# Patient Record
Sex: MALE | Race: WHITE | Employment: OTHER | URBAN - METROPOLITAN AREA
[De-identification: names, ages, dates, MRNs, and addresses within clinical notes are randomized per-mention and may not be internally consistent; named-entity substitution may affect disease eponyms.]

---

## 2017-10-17 ENCOUNTER — OFFICE VISIT (OUTPATIENT)
Dept: FAMILY MEDICINE CLINIC | Age: 82
End: 2017-10-17

## 2017-10-17 VITALS
HEIGHT: 68 IN | OXYGEN SATURATION: 97 % | DIASTOLIC BLOOD PRESSURE: 80 MMHG | SYSTOLIC BLOOD PRESSURE: 124 MMHG | BODY MASS INDEX: 30.95 KG/M2 | TEMPERATURE: 97.2 F | WEIGHT: 204.2 LBS | HEART RATE: 64 BPM

## 2017-10-17 DIAGNOSIS — M79.89 BILATERAL SWELLING OF FEET: ICD-10-CM

## 2017-10-17 DIAGNOSIS — I45.10 RBBB: ICD-10-CM

## 2017-10-17 DIAGNOSIS — Z76.89 ESTABLISHING CARE WITH NEW DOCTOR, ENCOUNTER FOR: ICD-10-CM

## 2017-10-17 DIAGNOSIS — Z71.85 VACCINE COUNSELING: ICD-10-CM

## 2017-10-17 DIAGNOSIS — H91.93 BILATERAL HEARING LOSS, UNSPECIFIED HEARING LOSS TYPE: ICD-10-CM

## 2017-10-17 DIAGNOSIS — Z91.81 AT HIGH RISK FOR FALLS: ICD-10-CM

## 2017-10-17 DIAGNOSIS — I48.91 ATRIAL FIBRILLATION, UNSPECIFIED TYPE (HCC): ICD-10-CM

## 2017-10-17 DIAGNOSIS — R26.81 UNSTEADY GAIT: ICD-10-CM

## 2017-10-17 DIAGNOSIS — R44.9 ABNORMAL FEELING: Primary | ICD-10-CM

## 2017-10-17 DIAGNOSIS — R94.31 ABNORMAL EKG: ICD-10-CM

## 2017-10-17 PROCEDURE — 99203 OFFICE O/P NEW LOW 30 MIN: CPT | Performed by: FAMILY MEDICINE

## 2017-10-17 PROCEDURE — 93000 ELECTROCARDIOGRAM COMPLETE: CPT | Performed by: FAMILY MEDICINE

## 2017-10-17 ASSESSMENT — ENCOUNTER SYMPTOMS
NAUSEA: 0
ABDOMINAL PAIN: 0
VOMITING: 0
DIARRHEA: 0
COUGH: 0
SHORTNESS OF BREATH: 0

## 2017-10-17 ASSESSMENT — PATIENT HEALTH QUESTIONNAIRE - PHQ9
1. LITTLE INTEREST OR PLEASURE IN DOING THINGS: 0
SUM OF ALL RESPONSES TO PHQ9 QUESTIONS 1 & 2: 0
SUM OF ALL RESPONSES TO PHQ QUESTIONS 1-9: 0
2. FEELING DOWN, DEPRESSED OR HOPELESS: 0

## 2017-10-17 NOTE — PROGRESS NOTES
Subjective:      Patient ID: Mert Song is a 80 y.o. male. Candance Hakim is here to establish care. Moved here from [place] 1 year ago to be closer to family. A couple weeks ago he was \"off\", just not acting like himself. That resolved. He denies CP, SOB, F/C, N/V during that time. He states he was Luz Candelario, but his wife reports that she and their daughter noticed he wasn't quite right. No weakness, slurred speech, facial drooping or other signs of stroke. Review of Systems   Constitutional: Negative for fever and unexpected weight change. HENT: Positive for hearing loss. Eyes: Negative for visual disturbance. Respiratory: Negative for cough and shortness of breath. Cardiovascular: Positive for leg swelling (feet). Negative for chest pain and palpitations. Gastrointestinal: Negative for abdominal pain, diarrhea, nausea and vomiting. Endocrine: Negative for cold intolerance and heat intolerance. Genitourinary: Negative for dysuria and hematuria. Skin: Negative for rash and wound. Neurological: Positive for light-headedness (when standing). Negative for dizziness, weakness and headaches. Hematological: Negative for adenopathy. Does not bruise/bleed easily. Objective:   Physical Exam   Constitutional: He is oriented to person, place, and time. He appears well-developed and well-nourished. No distress. HENT:   Head: Normocephalic and atraumatic. Right Ear: Tympanic membrane, external ear and ear canal normal.   Left Ear: Tympanic membrane, external ear and ear canal normal.   Mouth/Throat: Oropharynx is clear and moist and mucous membranes are normal. No oropharyngeal exudate or posterior oropharyngeal erythema. Eyes: Conjunctivae and EOM are normal. Pupils are equal, round, and reactive to light. Neck: Neck supple. Carotid bruit is not present. No thyromegaly present. Cardiovascular: Normal rate and regular rhythm. No murmur heard.   Pulmonary/Chest: Effort normal and breath sounds normal. He has no wheezes. He has no rales. Abdominal: Soft. Bowel sounds are normal. He exhibits no mass. There is no tenderness. Musculoskeletal: He exhibits edema (bilateral feet to just above the ankles, trace to 1+. ). Right foot: Normal. There is no tenderness and normal capillary refill. Left foot: Normal. There is no tenderness and normal capillary refill. Lymphadenopathy:     He has no cervical adenopathy. Neurological: He is alert and oriented to person, place, and time. He has normal reflexes. Psychiatric: He has a normal mood and affect. Nursing note and vitals reviewed. Assessment:      1. Abnormal feeling  EKG 12 Lead    [facility], Damon Rudolph MD   2. Abnormal EKG  [facility], Damon Rudolph MD   3. RBBB  [facility], Damon Rudolph MD   4. Atrial fibrillation, unspecified type ([address]     5. Unsteady gait     6. Bilateral swelling of feet     7. Bilateral hearing loss, unspecified hearing loss type     8. Vaccine counseling     9. At high risk for falls     10. Establishing care with new doctor, encounter for             Plan:      1 - 4. Given his age, an EKG was done for not feeling well. It showed A fib RBB, possible fascicular block and possible old MI. He and his wife report no history of such. Will send to cardio for further evaluation. He was reluctant to go, but I suggested he go for testing and evaluation and if surgery came up as a treatment, that we would sit back down and talk about it before he did surgery. He was [facility] [place] [facility] with that. 5. Uses a cane at home. Has knee pain as well, will give parking placard to help alleviate fall risk. 6. Appears to be dependent or venous insufficiency. Elevate legs when seated. Compression socks. 7. Has hearing aids but does not like to use them. 8. Offered flu shot, he declined, verbalizes understanding of risk. 9. See below. 10. Visit to [facility] care.

## 2017-10-18 RX ORDER — PNV NO.95/FERROUS FUM/FOLIC AC 28MG-0.8MG
1200 TABLET ORAL DAILY
COMMUNITY
End: 2019-01-29

## 2017-10-18 RX ORDER — MULTIVIT WITH MINERALS/LUTEIN
1000 TABLET ORAL DAILY
COMMUNITY
End: 2019-01-29

## 2017-11-08 ENCOUNTER — INITIAL CONSULT (OUTPATIENT)
Dept: FAMILY MEDICINE CLINIC | Age: 82
End: 2017-11-08

## 2017-11-08 VITALS
WEIGHT: 203.2 LBS | BODY MASS INDEX: 30.8 KG/M2 | HEART RATE: 56 BPM | HEIGHT: 68 IN | SYSTOLIC BLOOD PRESSURE: 132 MMHG | DIASTOLIC BLOOD PRESSURE: 76 MMHG

## 2017-11-08 DIAGNOSIS — I48.19 PERSISTENT ATRIAL FIBRILLATION (HCC): ICD-10-CM

## 2017-11-08 DIAGNOSIS — R94.31 ABNORMAL EKG: ICD-10-CM

## 2017-11-08 DIAGNOSIS — I50.32 CHRONIC DIASTOLIC CONGESTIVE HEART FAILURE (HCC): ICD-10-CM

## 2017-11-08 PROCEDURE — G8484 FLU IMMUNIZE NO ADMIN: HCPCS | Performed by: INTERNAL MEDICINE

## 2017-11-08 PROCEDURE — 99215 OFFICE O/P EST HI 40 MIN: CPT | Performed by: INTERNAL MEDICINE

## 2017-11-08 PROCEDURE — G8427 DOCREV CUR MEDS BY ELIG CLIN: HCPCS | Performed by: INTERNAL MEDICINE

## 2017-11-08 PROCEDURE — 4040F PNEUMOC VAC/ADMIN/RCVD: CPT | Performed by: INTERNAL MEDICINE

## 2017-11-08 PROCEDURE — G8417 CALC BMI ABV UP PARAM F/U: HCPCS | Performed by: INTERNAL MEDICINE

## 2017-11-08 NOTE — PROGRESS NOTES
CARDIOLOGY CONSULT  NOTE    Chief Complaint: afib/ abnormal EKG     HPI:   Rafaela Monson is a 80y.o. year old who has history as noted below. HE is here for evaluation due to abnormal EKG which was picked up recently during physical exam. Wife tells me he sleeps more than 12 hrs at night and then takes regualr naps. Recently he had an episode of confusion as well. He did have ankle swelling off and on. He denies any chest pain or shortness of breath but he is but he is not very active. HE tends to trip but has not had any major fall. EKG shows Afib, inferior Q waves, right axis and RBBB with HR in 40's       Current Outpatient Prescriptions   Medication Sig Dispense Refill    Ascorbic Acid (VITAMIN C IMMUNE HEALTH PO) Take 500 mg by mouth      vitamin A 8000 units capsule Take 8,000 Units by mouth daily      B Complex-C-Folic Acid TABS Take by mouth daily      vitamin E 1000 units capsule Take 1,000 Units by mouth daily      Omega-3 Fatty Acids (FISH OIL OMEGA-3) 1000 MG CAPS Take 1,200 mg by mouth daily      Handicap Placard MISC by Does not apply route Good from 10/17/17 through 10/17/22 1 each 0     No current facility-administered medications for this visit. Allergies:   Review of patient's allergies indicates no known allergies. Patient History:  History reviewed. No pertinent past medical history. History reviewed. No pertinent surgical history.   Family History   Problem Relation Age of Onset    Cancer Sister     Diabetes Brother      Social History   Substance Use Topics    Smoking status: Former Smoker     Quit date: 10/17/1987    Smokeless tobacco: Never Used      Comment: quit over 25 years ago     Alcohol use Yes      Comment: mixed drink before dinner        Review of Systems:   · Constitutional: No Fever or Weight Loss   · Eyes: No Decreased Vision  · ENT: hear of hearing   · Cardiovascular: as per note above   · Respiratory: No cough or wheezing and as per note above. · Gastrointestinal: No abdominal pain, appetite loss, blood in stools, constipation, diarrhea or heartburn  · Genitourinary: No dysuria,  Positive for trouble voiding, or hematuria  · Musculoskeletal:  None, arthtritis  · Integumentary: No rash or pruritis  · Neurological: No TIA or stroke symptoms  · Psychiatric: No anxiety or depression  · Endocrine: No malaise, fatigue or temperature intolerance  · Hematologic/Lymphatic: No bleeding problems, blood clots or swollen lymph nodes  · Allergic/Immunologic: No nasal congestion or hives    Objective:      Physical Exam:  /76   Pulse 56   Ht 5' 8\" (1.727 m)   Wt 203 lb 3.2 oz (92.2 kg)   BMI 30.90 kg/m²   Wt Readings from Last 3 Encounters:   11/08/17 203 lb 3.2 oz (92.2 kg)   10/17/17 204 lb 3.2 oz (92.6 kg)     Body mass index is 30.9 kg/m². Vitals:    11/08/17 1435   BP: 132/76   Pulse: 56        General Appearance:  No distress, conversant, looks younger than his stated age   Constitutional:  Well developed, Well nourished, No acute distress, Non-toxic appearance. HENT:  Normocephalic, Atraumatic, Bilateral external ears normal, Oropharynx moist, No oral exudates, Nose normal. Neck- Normal range of motion, No tenderness, Supple, No stridor,no apical-carotid delay  Eyes:  PERRL, EOMI, Conjunctiva normal, No discharge. Respiratory:  Normal breath sounds, No respiratory distress, No wheezing, No chest tenderness. ,no use of accessory muscles,   Cardiovascular: (PMI) apex non displaced,no lifts no thrills,S1 and S2 audible, No added heart sounds, positive for 2 + ankle edema, or volume overload, No evidence of JVD  GI:  Bowel sounds normal, Soft, No tenderness, No masses, No gross visceromegaly   :  No costovertebral angle tenderness   Musculoskeletal:  No edema, no tenderness, no deformities.  Back- no tenderness  Integument:  Well hydrated, no rash   Lymphatic:  No lymphadenopathy noted   Neurologic:  Alert & oriented x 3, CN 2-12 normal, normal motor function, normal sensory function, no focal deficits noted   Psychiatric:  Speech and behavior appropriate       Medical decision making and Data review:  DATA:  No results found for: TROPONINT  BNP:  No results found for: PROBNP  PT/INR:  No results found for: PTINR  No results found for: LABA1C  No results found for: CHOL, TRIG, HDL, LDLCALC, LDLDIRECT  No results found for: ALT, AST  TSH: No results found for: TSH      All labs, medications and tests reviewed by myself including data and history from outside source , patient and available family . Assessment & Plan:      1. Persistent atrial fibrillation ([address]    2. Chronic diastolic congestive heart failure ([address]    3. Abnormal EKG         Persistent atrial fibrillation ([facility]  He has afib but he is not aware , wife reports he has been more tired. With HR in 40's would like to get holter and check echo. HE wants to think about it all and will get back to me . His .chads score is 3 if we consider chf for ankle edema , but 2 for age , HE is fall risk but still active we can consider anti coagulation , family will debate and call me back with answears . wew reviewed literature and indication of anti coagulation vs risk of bleeding     Chronic diastolic congestive heart failure (HCC)  Mild ankle swelling , he could have diastolic dysfunction , get echo      Dyslipidemia :  Nghia Perla does not have any recent labs ,    Daughter and wife report that  they would like to be conservative and may not even want more holter testing . They will get back to us if they decide they want to purse further work up which is appropriate . Counseled extensively and medication compliance urged. continue current medications. Appropriate prescriptions are addressed and refills ordered. Questions answered and patient verbalizes understanding. Call for any problems, questions, or concerns.     Continue all other medications of all above medical condition listed as is. Return in about 1 month (around 12/8/2017).

## 2017-11-08 NOTE — ASSESSMENT & PLAN NOTE
He has afib but he is not aware , wife reports he has been more tired. With HR in 40's would like to get holter and check echo. HE wants to think about it all and will get back to me . His .chads score is 3 if you consider chf for ankle edema , but 2 for age , HE is fall risk but still active we can consider anti coagulation , family will debate and call me back with answears .  wew reviewed literature and indication of anti coagulation vs risk of bleeding

## 2019-01-29 ENCOUNTER — OFFICE VISIT (OUTPATIENT)
Dept: FAMILY MEDICINE CLINIC | Age: 84
End: 2019-01-29
Payer: MEDICARE

## 2019-01-29 VITALS
WEIGHT: 201 LBS | HEART RATE: 100 BPM | TEMPERATURE: 97.5 F | DIASTOLIC BLOOD PRESSURE: 80 MMHG | SYSTOLIC BLOOD PRESSURE: 132 MMHG | BODY MASS INDEX: 30.56 KG/M2 | OXYGEN SATURATION: 95 %

## 2019-01-29 DIAGNOSIS — R26.9 GAIT ABNORMALITY: ICD-10-CM

## 2019-01-29 DIAGNOSIS — M25.562 PAIN IN BOTH KNEES, UNSPECIFIED CHRONICITY: ICD-10-CM

## 2019-01-29 DIAGNOSIS — I48.19 PERSISTENT ATRIAL FIBRILLATION (HCC): ICD-10-CM

## 2019-01-29 DIAGNOSIS — M25.561 PAIN IN BOTH KNEES, UNSPECIFIED CHRONICITY: ICD-10-CM

## 2019-01-29 DIAGNOSIS — Z91.81 AT HIGH RISK FOR FALLS: ICD-10-CM

## 2019-01-29 DIAGNOSIS — R29.898 WEAKNESS OF BOTH LOWER EXTREMITIES: ICD-10-CM
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
## 2019-01-29 PROBLEM — I50.32 CHRONIC DIASTOLIC CONGESTIVE HEART FAILURE (HCC): Status: RESOLVED | Noted: 2017-11-08 | Resolved: 2019-01-29

## 2019-01-29 PROCEDURE — 1123F ACP DISCUSS/DSCN MKR DOCD: CPT | Performed by: FAMILY MEDICINE

## 2019-01-29 PROCEDURE — G8417 CALC BMI ABV UP PARAM F/U: HCPCS | Performed by: FAMILY MEDICINE

## 2019-01-29 PROCEDURE — 4040F PNEUMOC VAC/ADMIN/RCVD: CPT | Performed by: FAMILY MEDICINE

## 2019-01-29 PROCEDURE — G8427 DOCREV CUR MEDS BY ELIG CLIN: HCPCS | Performed by: FAMILY MEDICINE

## 2019-01-29 PROCEDURE — G8484 FLU IMMUNIZE NO ADMIN: HCPCS | Performed by: FAMILY MEDICINE

## 2019-01-29 PROCEDURE — 99214 OFFICE O/P EST MOD 30 MIN: CPT | Performed by: FAMILY MEDICINE

## 2019-01-29 PROCEDURE — 1036F TOBACCO NON-USER: CPT | Performed by: FAMILY MEDICINE

## 2019-01-29 PROCEDURE — 1101F PT FALLS ASSESS-DOCD LE1/YR: CPT | Performed by: FAMILY MEDICINE

## 2019-01-29 ASSESSMENT — PATIENT HEALTH QUESTIONNAIRE - PHQ9: DEPRESSION UNABLE TO ASSESS: URGENT/EMERGENT SITUATION

## 2019-01-29 ASSESSMENT — ENCOUNTER SYMPTOMS: SHORTNESS OF BREATH: 0

## 2019-02-04 ENCOUNTER — TELEPHONE (OUTPATIENT)
Dept: FAMILY MEDICINE CLINIC | Age: 84
End: 2019-02-04

## 2019-07-09 ENCOUNTER — OFFICE VISIT (OUTPATIENT)
Dept: FAMILY MEDICINE CLINIC | Age: 84
End: 2019-07-09
Payer: MEDICARE

## 2019-07-09 VITALS
HEART RATE: 82 BPM | DIASTOLIC BLOOD PRESSURE: 74 MMHG | OXYGEN SATURATION: 95 % | TEMPERATURE: 96.9 F | BODY MASS INDEX: 30.41 KG/M2 | SYSTOLIC BLOOD PRESSURE: 144 MMHG | WEIGHT: 200 LBS

## 2019-07-09 DIAGNOSIS — R19.5 LOOSE STOOLS: Primary | ICD-10-CM

## 2019-07-09 DIAGNOSIS — F03.90 DEMENTIA WITHOUT BEHAVIORAL DISTURBANCE, UNSPECIFIED DEMENTIA TYPE: ICD-10-CM

## 2019-07-09 PROCEDURE — G8417 CALC BMI ABV UP PARAM F/U: HCPCS | Performed by: FAMILY MEDICINE

## 2019-07-09 PROCEDURE — 99213 OFFICE O/P EST LOW 20 MIN: CPT | Performed by: FAMILY MEDICINE

## 2019-07-09 PROCEDURE — 1123F ACP DISCUSS/DSCN MKR DOCD: CPT | Performed by: FAMILY MEDICINE

## 2019-07-09 PROCEDURE — 1036F TOBACCO NON-USER: CPT | Performed by: FAMILY MEDICINE

## 2019-07-09 PROCEDURE — 4040F PNEUMOC VAC/ADMIN/RCVD: CPT | Performed by: FAMILY MEDICINE

## 2019-07-09 PROCEDURE — G8427 DOCREV CUR MEDS BY ELIG CLIN: HCPCS | Performed by: FAMILY MEDICINE

## 2019-07-09 ASSESSMENT — PATIENT HEALTH QUESTIONNAIRE - PHQ9
SUM OF ALL RESPONSES TO PHQ QUESTIONS 1-9: 0
2. FEELING DOWN, DEPRESSED OR HOPELESS: 0
SUM OF ALL RESPONSES TO PHQ9 QUESTIONS 1 & 2: 0
SUM OF ALL RESPONSES TO PHQ QUESTIONS 1-9: 0
1. LITTLE INTEREST OR PLEASURE IN DOING THINGS: 0

## 2019-07-09 ASSESSMENT — ENCOUNTER SYMPTOMS: SHORTNESS OF BREATH: 0

## 2019-07-09 NOTE — PATIENT INSTRUCTIONS
Try increasing fiber in the diet - fruits and vegetables. Consider use of depends when there is a longer distance to the restroom.

## 2019-10-23 ENCOUNTER — OFFICE VISIT (OUTPATIENT)
Dept: FAMILY MEDICINE CLINIC | Age: 84
End: 2019-10-23
Payer: MEDICARE

## 2019-10-23 VITALS
TEMPERATURE: 96.4 F | OXYGEN SATURATION: 95 % | DIASTOLIC BLOOD PRESSURE: 80 MMHG | HEART RATE: 81 BPM | SYSTOLIC BLOOD PRESSURE: 136 MMHG

## 2019-10-23 DIAGNOSIS — F03.90 DEMENTIA WITHOUT BEHAVIORAL DISTURBANCE, UNSPECIFIED DEMENTIA TYPE: ICD-10-CM

## 2019-10-23 DIAGNOSIS — R19.5 LOOSE STOOLS: Primary | ICD-10-CM
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
## 2019-10-23 PROCEDURE — G8484 FLU IMMUNIZE NO ADMIN: HCPCS | Performed by: FAMILY MEDICINE

## 2019-10-23 PROCEDURE — 1123F ACP DISCUSS/DSCN MKR DOCD: CPT | Performed by: FAMILY MEDICINE

## 2019-10-23 PROCEDURE — G8427 DOCREV CUR MEDS BY ELIG CLIN: HCPCS | Performed by: FAMILY MEDICINE

## 2019-10-23 PROCEDURE — 1036F TOBACCO NON-USER: CPT | Performed by: FAMILY MEDICINE

## 2019-10-23 PROCEDURE — 99214 OFFICE O/P EST MOD 30 MIN: CPT | Performed by: FAMILY MEDICINE

## 2019-10-23 PROCEDURE — 4040F PNEUMOC VAC/ADMIN/RCVD: CPT | Performed by: FAMILY MEDICINE

## 2019-10-23 PROCEDURE — 36415 COLL VENOUS BLD VENIPUNCTURE: CPT | Performed by: FAMILY MEDICINE

## 2019-10-23 PROCEDURE — G8417 CALC BMI ABV UP PARAM F/U: HCPCS | Performed by: FAMILY MEDICINE

## 2019-10-23 ASSESSMENT — ENCOUNTER SYMPTOMS
ABDOMINAL PAIN: 0
DIARRHEA: 1
VOMITING: 0
VISUAL CHANGE: 0

## 2019-10-24 LAB
A/G RATIO: 2.2 (ref 1.1–2.2)
ALBUMIN SERPL-MCNC: 4.7 G/DL (ref 3.4–5)
ALP BLD-CCNC: 74 U/L (ref 40–129)
ALT SERPL-CCNC: 13 U/L (ref 10–40)
ANION GAP SERPL CALCULATED.3IONS-SCNC: 16 MMOL/L (ref 3–16)
AST SERPL-CCNC: 13 U/L (ref 15–37)
BASOPHILS ABSOLUTE: 0 K/UL (ref 0–0.2)
BASOPHILS RELATIVE PERCENT: 0.6 %
BILIRUB SERPL-MCNC: 0.5 MG/DL (ref 0–1)
BUN BLDV-MCNC: 23 MG/DL (ref 7–20)
CALCIUM SERPL-MCNC: 9.2 MG/DL (ref 8.3–10.6)
CHLORIDE BLD-SCNC: 102 MMOL/L (ref 99–110)
CO2: 21 MMOL/L (ref 21–32)
CREAT SERPL-MCNC: 1.1 MG/DL (ref 0.8–1.3)
EOSINOPHILS ABSOLUTE: 0.1 K/UL (ref 0–0.6)
EOSINOPHILS RELATIVE PERCENT: 2.4 %
GFR AFRICAN AMERICAN: >60
GFR NON-AFRICAN AMERICAN: >60
GLOBULIN: 2.1 G/DL
GLUCOSE BLD-MCNC: 205 MG/DL (ref 70–99)
HCT VFR BLD CALC: 42.1 % (ref 40.5–52.5)
HEMOGLOBIN: 14.1 G/DL (ref 13.5–17.5)
LYMPHOCYTES ABSOLUTE: 1.3 K/UL (ref 1–5.1)
LYMPHOCYTES RELATIVE PERCENT: 25.7 %
MCH RBC QN AUTO: 32.1 PG (ref 26–34)
MCHC RBC AUTO-ENTMCNC: 33.4 G/DL (ref 31–36)
MCV RBC AUTO: 96.1 FL (ref 80–100)
MONOCYTES ABSOLUTE: 0.4 K/UL (ref 0–1.3)
MONOCYTES RELATIVE PERCENT: 7.1 %
NEUTROPHILS ABSOLUTE: 3.3 K/UL (ref 1.7–7.7)
NEUTROPHILS RELATIVE PERCENT: 64.2 %
PDW BLD-RTO: 14.1 % (ref 12.4–15.4)
PLATELET # BLD: 248 K/UL (ref 135–450)
PMV BLD AUTO: 7.7 FL (ref 5–10.5)
POTASSIUM SERPL-SCNC: 4.3 MMOL/L (ref 3.5–5.1)
RBC # BLD: 4.38 M/UL (ref 4.2–5.9)
SODIUM BLD-SCNC: 139 MMOL/L (ref 136–145)
TOTAL PROTEIN: 6.8 G/DL (ref 6.4–8.2)
WBC # BLD: 5.1 K/UL (ref 4–11)

## 2019-11-14 ENCOUNTER — TELEPHONE (OUTPATIENT)
Dept: FAMILY MEDICINE CLINIC | Age: 84
End: 2019-11-14

## 2019-11-14 DIAGNOSIS — R19.5 LOOSE STOOLS: Primary | ICD-10-CM

## 2020-04-29 ENCOUNTER — VIRTUAL VISIT (OUTPATIENT)
Dept: FAMILY MEDICINE CLINIC | Age: 85
End: 2020-04-29
Payer: MEDICARE

## 2020-04-29 PROCEDURE — G8428 CUR MEDS NOT DOCUMENT: HCPCS | Performed by: FAMILY MEDICINE

## 2020-04-29 PROCEDURE — 4040F PNEUMOC VAC/ADMIN/RCVD: CPT | Performed by: FAMILY MEDICINE

## 2020-04-29 PROCEDURE — 99214 OFFICE O/P EST MOD 30 MIN: CPT | Performed by: FAMILY MEDICINE

## 2020-04-29 PROCEDURE — 1123F ACP DISCUSS/DSCN MKR DOCD: CPT | Performed by: FAMILY MEDICINE

## 2020-04-29 ASSESSMENT — ENCOUNTER SYMPTOMS: SHORTNESS OF BREATH: 0

## 2020-05-12 ENCOUNTER — OFFICE VISIT (OUTPATIENT)
Dept: FAMILY MEDICINE CLINIC | Age: 85
End: 2020-05-12
Payer: MEDICARE

## 2020-05-12 VITALS
OXYGEN SATURATION: 98 % | HEART RATE: 64 BPM | WEIGHT: 197.4 LBS | BODY MASS INDEX: 30.01 KG/M2 | SYSTOLIC BLOOD PRESSURE: 132 MMHG | TEMPERATURE: 96.9 F | DIASTOLIC BLOOD PRESSURE: 80 MMHG

## 2020-05-12 LAB
ANION GAP SERPL CALCULATED.3IONS-SCNC: 11 MMOL/L (ref 3–16)
BUN BLDV-MCNC: 17 MG/DL (ref 7–20)
CALCIUM SERPL-MCNC: 9.7 MG/DL (ref 8.3–10.6)
CHLORIDE BLD-SCNC: 101 MMOL/L (ref 99–110)
CO2: 23 MMOL/L (ref 21–32)
CREAT SERPL-MCNC: 0.9 MG/DL (ref 0.8–1.3)
GFR AFRICAN AMERICAN: >60
GFR NON-AFRICAN AMERICAN: >60
GLUCOSE BLD-MCNC: 288 MG/DL (ref 70–99)
HBA1C MFR BLD: 9.2 %
POTASSIUM SERPL-SCNC: 4.4 MMOL/L (ref 3.5–5.1)
SODIUM BLD-SCNC: 135 MMOL/L (ref 136–145)

## 2020-05-12 PROCEDURE — G8427 DOCREV CUR MEDS BY ELIG CLIN: HCPCS | Performed by: FAMILY MEDICINE

## 2020-05-12 PROCEDURE — 99214 OFFICE O/P EST MOD 30 MIN: CPT | Performed by: FAMILY MEDICINE

## 2020-05-12 PROCEDURE — 4040F PNEUMOC VAC/ADMIN/RCVD: CPT | Performed by: FAMILY MEDICINE

## 2020-05-12 PROCEDURE — 1036F TOBACCO NON-USER: CPT | Performed by: FAMILY MEDICINE

## 2020-05-12 PROCEDURE — 36415 COLL VENOUS BLD VENIPUNCTURE: CPT | Performed by: FAMILY MEDICINE

## 2020-05-12 PROCEDURE — 1123F ACP DISCUSS/DSCN MKR DOCD: CPT | Performed by: FAMILY MEDICINE

## 2020-05-12 PROCEDURE — G8417 CALC BMI ABV UP PARAM F/U: HCPCS | Performed by: FAMILY MEDICINE

## 2020-05-12 PROCEDURE — 83036 HEMOGLOBIN GLYCOSYLATED A1C: CPT | Performed by: FAMILY MEDICINE

## 2020-05-12 RX ORDER — GLUCOSAMINE HCL/CHONDROITIN SU 500-400 MG
CAPSULE ORAL
Qty: 100 STRIP | Refills: 3 | Status: SHIPPED | OUTPATIENT
Start: 2020-05-12

## 2020-05-12 RX ORDER — LANCETS 30 GAUGE
1 EACH MISCELLANEOUS DAILY
Qty: 100 EACH | Refills: 3 | Status: SHIPPED | OUTPATIENT
Start: 2020-05-12

## 2020-05-12 ASSESSMENT — PATIENT HEALTH QUESTIONNAIRE - PHQ9
SUM OF ALL RESPONSES TO PHQ QUESTIONS 1-9: 0
SUM OF ALL RESPONSES TO PHQ QUESTIONS 1-9: 0
2. FEELING DOWN, DEPRESSED OR HOPELESS: 0
SUM OF ALL RESPONSES TO PHQ9 QUESTIONS 1 & 2: 0
1. LITTLE INTEREST OR PLEASURE IN DOING THINGS: 0

## 2020-05-12 ASSESSMENT — ENCOUNTER SYMPTOMS
SHORTNESS OF BREATH: 0
NAUSEA: 0
VOMITING: 0

## 2020-05-12 NOTE — PROGRESS NOTES
Subjective:      Patient ID: Vish Buenrostro is a 80 y.o. male. Beatris Apgar is here with concerns for leg swelling, diarrhea and skin irritaiton. Leg swelling  Getting HH, and they have noticed swelling that comes and goes. May be present for  Few hours to a few days. No relation to diet, noted. He is not very active, and is tired a lot. He sleeps often. No blisters or skin changes. Diarrhea  Chronic/recurrent problem. Typically one loose stool that may occur every day to every few weeks. Imodium OTC does not help. Referral to GI siggested IBS. Pt is wearing depends all the time because of this and urine leakage. HH concerned with skin breakdown from the depends and immobility. Cream was provided to help restore/protect the skin. Review of previous work up for diarrhea notes an elevated blood sugar last fall. Review of Systems   Respiratory: Negative for shortness of breath. Cardiovascular: Negative for chest pain. Gastrointestinal: Negative for nausea and vomiting. No past medical history on file. No past surgical history on file.   Social History     Socioeconomic History    Marital status:      Spouse name: Not on file    Number of children: Not on file    Years of education: Not on file    Highest education level: Not on file   Occupational History    Not on file   Social Needs    Financial resource strain: Not on file    Food insecurity     Worry: Not on file     Inability: Not on file    Transportation needs     Medical: Not on file     Non-medical: Not on file   Tobacco Use    Smoking status: Former Smoker     Last attempt to quit: 10/17/1987     Years since quittin.5    Smokeless tobacco: Never Used    Tobacco comment: quit over 25 years ago    Substance and Sexual Activity    Alcohol use: Yes     Comment: mixed drink before dinner    Drug use: No    Sexual activity: Not on file   Lifestyle    Physical activity     Days per week: Not on file     Minutes per session: Not on file    Stress: Not on file   Relationships    Social connections     Talks on phone: Not on file     Gets together: Not on file     Attends Oriental orthodox service: Not on file     Active member of club or organization: Not on file     Attends meetings of clubs or organizations: Not on file     Relationship status: Not on file    Intimate partner violence     Fear of current or ex partner: Not on file     Emotionally abused: Not on file     Physically abused: Not on file     Forced sexual activity: Not on file   Other Topics Concern    Not on file   Social History Narrative    Not on file     Family History   Problem Relation Age of Onset    Cancer Sister     Diabetes Brother         Objective:   Physical Exam  Vitals signs and nursing note reviewed. Constitutional:       Appearance: Normal appearance. He is not ill-appearing. Abdominal:      General: Bowel sounds are normal.      Palpations: Abdomen is soft. There is no mass. Tenderness: There is no abdominal tenderness. Musculoskeletal:      Right lower leg: Edema (trace) present. Left lower leg: Edema (trace) present. Skin:     Comments: Irritated skin in groin and on buttock. No skin breakdown appreciated. Neurological:      General: No focal deficit present. Mental Status: He is alert. Psychiatric:         Mood and Affect: Mood normal.         Behavior: Behavior normal.         Assessment:       Diagnosis Orders   1. Type 2 diabetes mellitus without complication, without long-term current use of insulin ([facility])  BASIC METABOLIC PANEL    blood glucose monitor kit and supplies    blood glucose monitor strips    Lancets MISC    metFORMIN (GLUCOPHAGE) 500 MG tablet   2. Hyperglycemia  POCT glycosylated hemoglobin (Hb A1C)   3. Loose stools     4. Pedal edema     5. Dementia without behavioral disturbance, unspecified dementia type ([address]     6. Skin irritation            Plan:      1 & 2. A1c in office was 9.2%.  He eats a lot of sweets. Will have them cut down on those. Add metformin 500 mg once a day. Check blood sugars for symptoms of too low blood sugar. BMP obtained to check renal function - was fine last fall. 3. May be sugar related. Will see if they return to normal with improved blood sugar control. If not, will refer back to GI. 4. Probably inactivity related. HH is helping with PT and functionality. Will see if that helps. 5. Stable. Continue care. 6. No skin break down. Continue protective cream use.      Follow up 1 month: DM      Current Outpatient Medications:     Petrolatum-Zinc Oxide (PHYTOPLEX Z-GUARD EX), Apply topically, Disp: , Rfl:     blood glucose monitor kit and supplies, Test 1 times a day & as needed for symptoms of irregular blood glucose., Disp: 1 kit, Rfl: 0    blood glucose monitor strips, Test 1 times a day & as needed for symptoms of irregular blood glucose., Disp: 100 strip, Rfl: 3    Lancets MISC, 1 each by Does not apply route daily, Disp: 100 each, Rfl: 3    metFORMIN (GLUCOPHAGE) 500 MG tablet, Take 1 tablet by mouth daily Take with largest meal of the day, Disp: 30 tablet, Rfl: 1         Helena Loya MD

## 2020-05-14 ENCOUNTER — TELEPHONE (OUTPATIENT)
Dept: FAMILY MEDICINE CLINIC | Age: 85
End: 2020-05-14

## 2020-05-15 NOTE — TELEPHONE ENCOUNTER
Notified state patient has not been taking the metformin she will call daughter to have her pick it up from the pharmacy

## 2020-05-18 ENCOUNTER — TELEPHONE (OUTPATIENT)
Dept: FAMILY MEDICINE CLINIC | Age: 85
End: 2020-05-18

## 2020-05-20 ENCOUNTER — TELEPHONE (OUTPATIENT)
Dept: FAMILY MEDICINE CLINIC | Age: 85
End: 2020-05-20

## 2020-05-20 NOTE — TELEPHONE ENCOUNTER
Radha from [facility] called stating patient's BP was 160/63 from Occupation therapy, retaken from [address] 30 minutes later was 142/70. Oxygen level at rest was 96,but did drop to 88 while walking.

## 2020-05-22 ENCOUNTER — TELEPHONE (OUTPATIENT)
Dept: FAMILY MEDICINE CLINIC | Age: 85
End: 2020-05-22

## 2020-06-01 ENCOUNTER — TELEPHONE (OUTPATIENT)
Dept: FAMILY MEDICINE CLINIC | Age: 85
End: 2020-06-01

## 2020-06-01 NOTE — TELEPHONE ENCOUNTER
Is patient having any symptoms associated with his low BP - dizziness, light headed, vision changes, etc?

## 2020-06-01 NOTE — TELEPHONE ENCOUNTER
[facility] calling to report pulse at rest is out of parameters at 49.  Requesting a call back with further questions if needed

## 2020-06-03 ENCOUNTER — TELEPHONE (OUTPATIENT)
Dept: FAMILY MEDICINE CLINIC | Age: 85
End: 2020-06-03

## 2020-06-03 NOTE — TELEPHONE ENCOUNTER
Lydia Palomares with [facility] called stating patient's blood pressure was 151/72 at 1:45 pm today. Patient did not have any symptoms. Lydia Palomares can be contacted back at [phone].

## 2020-07-16 ENCOUNTER — VIRTUAL VISIT (OUTPATIENT)
Dept: FAMILY MEDICINE CLINIC | Age: 85
End: 2020-07-16
Payer: MEDICARE

## 2020-07-16 PROCEDURE — G8427 DOCREV CUR MEDS BY ELIG CLIN: HCPCS | Performed by: FAMILY MEDICINE

## 2020-07-16 PROCEDURE — 4040F PNEUMOC VAC/ADMIN/RCVD: CPT | Performed by: FAMILY MEDICINE

## 2020-07-16 PROCEDURE — 99214 OFFICE O/P EST MOD 30 MIN: CPT | Performed by: FAMILY MEDICINE

## 2020-07-16 PROCEDURE — 1123F ACP DISCUSS/DSCN MKR DOCD: CPT | Performed by: FAMILY MEDICINE

## 2020-07-16 RX ORDER — OXYBUTYNIN CHLORIDE 5 MG/1
5 TABLET, EXTENDED RELEASE ORAL DAILY
Qty: 30 TABLET | Refills: 0 | Status: SHIPPED | OUTPATIENT
Start: 2020-07-16 | End: 2020-08-10

## 2020-07-16 ASSESSMENT — ENCOUNTER SYMPTOMS: SHORTNESS OF BREATH: 0

## 2020-07-16 NOTE — PROGRESS NOTES
2020    TELEHEALTH EVALUATION -- Audio/Visual (During [facility]-71 public health emergency)    HPI:    Radha Samuels (:  [phone]) has requested an audio/video evaluation for the following concern(s):    Most of the history was provided by his wife and daughter. Swollen ankles  Present daily, both legs. Improves over night. Spends much of his day in a chair, much less active. Denies CP/SOB. No use of stalkings. Uses a walker to walk. Loss of Control of Urination  Using depends all day long. Leaks through depends at night. Also using pads at night, not containing his urine. HHN suggested medication. DM  Diagnosed in the last year. On metformin. Tolerating well. No side effects. Not testing blood sugars at home. No other concerns. Review of Systems   Respiratory: Negative for shortness of breath. Cardiovascular: Negative for chest pain. Prior to Visit Medications    Medication Sig Taking? Authorizing Provider   metFORMIN (GLUCOPHAGE) 500 MG tablet Take 1 tablet by mouth daily Take with largest meal of the day Yes Arcadio Salcido MD   oxybutynin (DITROPAN-XL) 5 MG extended release tablet Take 1 tablet by mouth daily Yes Arcadio Salcido MD   Compression Stockings MISC by Does not apply route Use daily for treatment of leg edema Yes Arcadio Salcido MD   Petrolatum-Zinc Oxide (PHYTOPLEX Z-GUARD EX) Apply topically  Historical MD Se   blood glucose monitor kit and supplies Test 1 times a day & as needed for symptoms of irregular blood glucose. Arcadio Salcido MD   blood glucose monitor strips Test 1 times a day & as needed for symptoms of irregular blood glucose.   Arcadio Salcido MD   Lancets MISC 1 each by Does not apply route daily  Arcadio Salcido MD       Social History     Tobacco Use    Smoking status: Former Smoker     Last attempt to quit: 10/17/1987     Years since quittin.7    Smokeless tobacco: Never Used    Tobacco comment: quit over 25 years ago Substance Use Topics    Alcohol use: Yes     Comment: mixed drink before dinner    Drug use: No        No past medical history on file., No past surgical history on file.,   Family History   Problem Relation Age of Onset   Michelle Stoddard Cancer Sister     Diabetes Brother        PHYSICAL EXAMINATION:  [ INSTRUCTIONS:  \"[x]\" Indicates a positive item  \"[]\" Indicates a negative item  -- DELETE ALL ITEMS NOT EXAMINED]  Vital Signs: (As obtained by patient/caregiver or practitioner observation)    No VS available for review. Constitutional: [x] Appears well-developed and well-nourished [x] No apparent distress      [] Abnormal-   Mental status  [x] Alert and awake  [] Oriented to person/place/time [x]Able to follow commands          HENT:   [x] Normocephalic, atraumatic. [] Abnormal       External Ears [x] Normal  [] Abnormal-     Neck: [x] No visualized mass     Pulmonary/Chest: [x] Respiratory effort normal.  [x] No visualized signs of difficulty breathing or respiratory distress        [] Abnormal-      Musculoskeletal:        [x] Normal range of motion of neck        [] Abnormal-       Neurological:        [x] No Facial Asymmetry (Cranial nerve 7 motor function) (limited exam to video visit)            [] Abnormal-         Skin:        [x] No significant exanthematous lesions or discoloration noted on facial skin         [] Abnormal-            Psychiatric:       [x] Normal Affect [x] No Hallucinations        [] Abnormal-     Other pertinent observable physical exam findings-     ASSESSMENT/PLAN:  1. Leg swelling  Use compression stalkings daily. Elevate feet while sitting.   - Compression Stockings MISC; by Does not apply route Use daily for treatment of leg edema  Dispense: 2 each; Refill: 0    2. Urinary incontinence, unspecified type  Unable to ascertain if complete loss of control vs urge incontinence and unable to wake or make it to the bathroom. Will try ditropan and see if that helps.     - oxybutynin (DITROPAN-XL) 5 MG extended release tablet; Take 1 tablet by mouth daily  Dispense: 30 tablet; Refill: 0    3. Type 2 diabetes mellitus without complication, without long-term current use of insulin (HCC)  Continue metformin. Check A1c next month . - metFORMIN (GLUCOPHAGE) 500 MG tablet; Take 1 tablet by mouth daily Take with largest meal of the day  Dispense: 90 tablet; Refill: 0  - POCT glycosylated hemoglobin (Hb A1C); Future      Return pending labs and response to med and compression socks. Phillip Neri is a 80 y.o. male being evaluated by a Virtual Visit (video visit) encounter to address concerns as mentioned above. A caregiver was present when appropriate. Due to this being a TeleHealth encounter (During [facility]), evaluation of the following organ systems was limited: Vitals/Constitutional/EENT/Resp/CV/GI//MS/Neuro/Skin/Heme-Lymph-Imm. Pursuant to the emergency declaration under the [address] [place] [address], [address] [place] [address] authority and the [employer] Act, this Virtual Visit was conducted with patient's (and/or legal guardian's) consent, to reduce the patient's risk of exposure to COVID-19 and provide necessary medical care. The patient (and/or legal guardian) has also been advised to contact this office for worsening conditions or problems, and seek emergency medical treatment and/or call 911 if deemed necessary. Patient identification was verified at the start of the visit: Yes    Total time spent on this encounter: Not billed by time    Services were provided through a video synchronous discussion virtually to substitute for in-person clinic visit. Patient and provider were located at their individual homes. --Shyam Tristan MD on 7/16/2020 at 4:30 PM    An electronic signature was used to authenticate this note.

## 2020-09-02 RX ORDER — OXYBUTYNIN CHLORIDE 10 MG/1
TABLET, EXTENDED RELEASE ORAL
Qty: 30 TABLET | Refills: 0 | Status: SHIPPED | OUTPATIENT
Start: 2020-09-02

## 2020-09-25 ENCOUNTER — APPOINTMENT (OUTPATIENT)
Dept: CT IMAGING | Age: 85
DRG: 064 | End: 2020-09-25
Payer: MEDICARE

## 2020-09-25 ENCOUNTER — APPOINTMENT (OUTPATIENT)
Dept: GENERAL RADIOLOGY | Age: 85
DRG: 064 | End: 2020-09-25
Payer: MEDICARE

## 2020-09-25 ENCOUNTER — HOSPITAL ENCOUNTER (INPATIENT)
Age: 85
LOS: 5 days | Discharge: HOSPICE | DRG: 064 | End: 2020-09-30
Attending: EMERGENCY MEDICINE | Admitting: HOSPITALIST
Payer: MEDICARE

## 2020-09-25 PROBLEM — R29.90 STROKE-LIKE EPISODE: Status: ACTIVE | Noted: 2020-09-25

## 2020-09-25 LAB
ALBUMIN SERPL-MCNC: 3.8 GM/DL (ref 3.4–5)
ALP BLD-CCNC: 63 IU/L (ref 40–129)
ALT SERPL-CCNC: 12 U/L (ref 10–40)
ANION GAP SERPL CALCULATED.3IONS-SCNC: 12 MMOL/L (ref 4–16)
AST SERPL-CCNC: 13 IU/L (ref 15–37)
BASOPHILS ABSOLUTE: 0 K/CU MM
BASOPHILS RELATIVE PERCENT: 0.3 % (ref 0–1)
BILIRUB SERPL-MCNC: 0.3 MG/DL (ref 0–1)
BUN BLDV-MCNC: 20 MG/DL (ref 6–23)
CALCIUM SERPL-MCNC: 9.1 MG/DL (ref 8.3–10.6)
CHLORIDE BLD-SCNC: 98 MMOL/L (ref 99–110)
CO2: 24 MMOL/L (ref 21–32)
CREAT SERPL-MCNC: 0.9 MG/DL (ref 0.9–1.3)
DIFFERENTIAL TYPE: ABNORMAL
EOSINOPHILS ABSOLUTE: 0.2 K/CU MM
EOSINOPHILS RELATIVE PERCENT: 3.6 % (ref 0–3)
GFR AFRICAN AMERICAN: >60 ML/MIN/1.73M2
GFR NON-AFRICAN AMERICAN: >60 ML/MIN/1.73M2
GLUCOSE BLD-MCNC: 265 MG/DL (ref 70–99)
GLUCOSE BLD-MCNC: 273 MG/DL (ref 70–99)
HCT VFR BLD CALC: 44 % (ref 42–52)
HEMOGLOBIN: 14.2 GM/DL (ref 13.5–18)
IMMATURE NEUTROPHIL %: 0.2 % (ref 0–0.43)
INR BLD: 0.98 INDEX
LYMPHOCYTES ABSOLUTE: 1.8 K/CU MM
LYMPHOCYTES RELATIVE PERCENT: 29.1 % (ref 24–44)
MAGNESIUM: 2 MG/DL (ref 1.8–2.4)
MCH RBC QN AUTO: 31.1 PG (ref 27–31)
MCHC RBC AUTO-ENTMCNC: 32.3 % (ref 32–36)
MCV RBC AUTO: 96.5 FL (ref 78–100)
MONOCYTES ABSOLUTE: 0.6 K/CU MM
MONOCYTES RELATIVE PERCENT: 9.8 % (ref 0–4)
NUCLEATED RBC %: 0 %
PDW BLD-RTO: 12.7 % (ref 11.7–14.9)
PLATELET # BLD: 251 K/CU MM (ref 140–440)
PMV BLD AUTO: 9.7 FL (ref 7.5–11.1)
POTASSIUM SERPL-SCNC: 4.1 MMOL/L (ref 3.5–5.1)
PROTHROMBIN TIME: 11.9 SECONDS (ref 11.7–14.5)
RBC # BLD: 4.56 M/CU MM (ref 4.6–6.2)
REASON FOR REJECTION: NORMAL
REJECTED TEST: NORMAL
SEGMENTED NEUTROPHILS ABSOLUTE COUNT: 3.5 K/CU MM
SEGMENTED NEUTROPHILS RELATIVE PERCENT: 57 % (ref 36–66)
SODIUM BLD-SCNC: 134 MMOL/L (ref 135–145)
T4 FREE: 1.38 NG/DL (ref 0.9–1.8)
TOTAL IMMATURE NEUTOROPHIL: 0.01 K/CU MM
TOTAL NUCLEATED RBC: 0 K/CU MM
TOTAL PROTEIN: 6.5 GM/DL (ref 6.4–8.2)
TROPONIN T: <0.01 NG/ML
TSH HIGH SENSITIVITY: 1.68 UIU/ML (ref 0.27–4.2)
WBC # BLD: 6.1 K/CU MM (ref 4–10.5)

## 2020-09-25 PROCEDURE — 85610 PROTHROMBIN TIME: CPT

## 2020-09-25 PROCEDURE — 93005 ELECTROCARDIOGRAM TRACING: CPT | Performed by: EMERGENCY MEDICINE

## 2020-09-25 PROCEDURE — 70498 CT ANGIOGRAPHY NECK: CPT

## 2020-09-25 PROCEDURE — 2140000000 HC CCU INTERMEDIATE R&B

## 2020-09-25 PROCEDURE — 84443 ASSAY THYROID STIM HORMONE: CPT

## 2020-09-25 PROCEDURE — 85025 COMPLETE CBC W/AUTO DIFF WBC: CPT

## 2020-09-25 PROCEDURE — 70450 CT HEAD/BRAIN W/O DYE: CPT

## 2020-09-25 PROCEDURE — 82962 GLUCOSE BLOOD TEST: CPT

## 2020-09-25 PROCEDURE — 71045 X-RAY EXAM CHEST 1 VIEW: CPT

## 2020-09-25 PROCEDURE — 6360000004 HC RX CONTRAST MEDICATION: Performed by: EMERGENCY MEDICINE

## 2020-09-25 PROCEDURE — 84439 ASSAY OF FREE THYROXINE: CPT

## 2020-09-25 PROCEDURE — 80053 COMPREHEN METABOLIC PANEL: CPT

## 2020-09-25 PROCEDURE — 84484 ASSAY OF TROPONIN QUANT: CPT

## 2020-09-25 PROCEDURE — 99285 EMERGENCY DEPT VISIT HI MDM: CPT

## 2020-09-25 PROCEDURE — 36415 COLL VENOUS BLD VENIPUNCTURE: CPT

## 2020-09-25 PROCEDURE — 83735 ASSAY OF MAGNESIUM: CPT

## 2020-09-25 PROCEDURE — 70496 CT ANGIOGRAPHY HEAD: CPT

## 2020-09-25 RX ORDER — ASPIRIN 81 MG/1
324 TABLET, CHEWABLE ORAL ONCE
Status: DISCONTINUED | OUTPATIENT
Start: 2020-09-25 | End: 2020-09-26

## 2020-09-25 RX ADMIN — IOPAMIDOL 75 ML: 755 INJECTION, SOLUTION INTRAVENOUS at 19:39

## 2020-09-25 ASSESSMENT — ENCOUNTER SYMPTOMS
BLOOD IN STOOL: 0
NAUSEA: 0
ABDOMINAL PAIN: 0
SHORTNESS OF BREATH: 1
EYE REDNESS: 1
VOMITING: 0
RHINORRHEA: 0
SORE THROAT: 0
COUGH: 1

## 2020-09-25 NOTE — ED PROVIDER NOTES
Triage Chief Complaint:   Altered Mental Status    [place]:  David Mabry is a 80 y.o. male that presents with decreased verbal responsiveness and decreased activity. Patient's last known well is reported as 1700, per patient's family. Family reports to EMS that patient is \"healthy and is a horse and only on metformin as his only medicine\". Patient does spend most of the day sitting at the table and has been acting normally throughout the day. Patient ate a peanut butter sandwich at approximately 1700. Family reports EMS that shortly thereafter he became altered from his baseline. Patient was less verbal and less responsive. Patient with decreased motor activities and this prompted EMS call. EMS reports the patient is with initial hypoglycemia that was improved after administration of oral dextrose to the 200s. Patient symptoms have persisted and they concern for stroke and prehospital stroke alert was activated. Patient hypertensive prehospital but otherwise protecting his airway with stable vital signs. Further history from patient's wife who reports that patient is with dementia at baseline and that he \"normally does not talk much\". She reports that he ate a normal breakfast but did not want to eat lunch (around noon) which was concerning her and was the big change in his overall activity. Patient did also seem to be \"not focusing with his eyes as well as normal\". Patient does walk with a walker at baseline. ROS:  Limited as above. History reviewed. No pertinent past medical history. History reviewed. No pertinent surgical history.   Family History   Problem Relation Age of Onset    Cancer Sister     Diabetes Brother      Social History     Socioeconomic History    Marital status:      Spouse name: Not on file    Number of children: Not on file    Years of education: Not on file    Highest education level: Not on file   Occupational History    Not on file   Social Needs    Financial resource strain: Not on file    Food insecurity     Worry: Not on file     Inability: Not on file    Transportation needs     Medical: Not on file     Non-medical: Not on file   Tobacco Use    Smoking status: Former Smoker     Last attempt to quit: 10/17/1987     Years since quittin.9    Smokeless tobacco: Never Used    Tobacco comment: quit over 25 years ago    Substance and Sexual Activity    Alcohol use: Yes     Comment: mixed drink before dinner    Drug use: No    Sexual activity: Not on file   Lifestyle    Physical activity     Days per week: Not on file     Minutes per session: Not on file    Stress: Not on file   Relationships    Social connections     Talks on phone: Not on file     Gets together: Not on file     Attends [employer] service: Not on file     Active member of club or organization: Not on file     Attends meetings of clubs or organizations: Not on file     Relationship status: Not on file    Intimate partner violence     Fear of current or ex partner: Not on file     Emotionally abused: Not on file     Physically abused: Not on file     Forced sexual activity: Not on file   Other Topics Concern    Not on file   Social History Narrative    Not on file     Current Facility-Administered Medications   Medication Dose Route Frequency Provider Last Rate Last Dose    aspirin chewable tablet 324 mg  324 mg Oral Once Maya Blevins MD         Current Outpatient Medications   Medication Sig Dispense Refill    oxybutynin (DITROPAN-XL) 10 MG extended release tablet TAKE 1 TABLET BY MOUTH EVERY DAY 30 tablet 0    metFORMIN (GLUCOPHAGE) 500 MG tablet Take 1 tablet by mouth daily Take with largest meal of the day 90 tablet 0    Compression Stockings MISC by Does not apply route Use daily for treatment of leg edema 2 each 0    Petrolatum-Zinc Oxide (PHYTOPLEX Z-GUARD EX) Apply topically      blood glucose monitor kit and supplies Test 1 times a day & as needed for symptoms of irregular blood glucose. 1 kit 0    blood glucose monitor strips Test 1 times a day & as needed for symptoms of irregular blood glucose. 100 strip 3    Lancets MISC 1 each by Does not apply route daily 100 each 3     No Known Allergies    Nursing Notes Reviewed    Physical Exam:  ED Triage Vitals   Enc Vitals Group      BP       Pulse       Resp       Temp       Temp src       SpO2       Weight       Height       Head Circumference       Peak Flow       Pain Score       Pain Loc       Pain Edu? Excl. in [address]? GENERAL APPEARANCE: Awake and alert. Cooperative. Patient appears atraumatic. HEAD: Normocephalic. Atraumatic. EYES: EOM's grossly intact. Sclera anicteric. ENT: Mucous membranes are moist. Tolerates saliva. No trismus. NECK: Supple. No meningismus. Trachea midline. HEART: RRR. Radial pulses 2+. LUNGS: Respirations unlabored. CTAB  ABDOMEN: Soft. Non-tender. No guarding or rebound. EXTREMITIES: No acute deformities. SKIN: Warm and dry.   NEUROLOGICAL:   Jeremy Chang's NIH Stroke Scale at 9:23 PM is:  Level of Consciousness:  1 - not alert but arousable by minor stimulation to obey, answer or respond    LOC Questions:  0 - answers both questions correctly    LOC Commands:  2 - performs neither task correctly    Best Gaze:  1 - partial gaze palsy    Visual Fields:  0 - no visual loss (unable to assess)    Facial Palsy:  0 - normal symmetric movement    Motor-Arm-Left:  0 - no drift, limb holds 90 (or 45) degrees for full 10 seconds    Motor-Leg-Left:  0 - no drift; leg holds 30 degree position for full 5 seconds    Motor-Arm-Right:  2 - some effort against gravity, limb cannot get to or maintain (if cued) 90 (or 45) degrees, drifts down to bed, but has some effort against gravity     Motor-Leg-Right:  2 - some effort against gravity; leg falls to bed by 5 seconds but has some effort against gravity    Limb Ataxia:  0 - absent (patient not following this command and unable to assess)    Sensory:  0 - normal; no sensory loss    Best Language:  1 - mild to moderate aphasia; some obvious loss of fluency or facility of comprehension without significant limitation on ideas expressed or form of expression. Reduction of speech and/or comprehension, however, makes conversation about provided materials difficult or impossible. For example, in conversation about provided materials, examiner can identify picture or naming card content from patient's response. Dysarthria:  0 - normal    Extinction and Inattention:  0 - no abnormality  PSYCHIATRIC: Normal mood.     I have reviewed and interpreted all of the currently available lab results from this visit (if applicable):  Results for orders placed or performed during the hospital encounter of 09/25/20   CBC Auto Differential   Result Value Ref Range    WBC 6.1 4.0 - 10.5 K/CU MM    RBC 4.56 (L) 4.6 - 6.2 M/CU MM    Hemoglobin 14.2 13.5 - 18.0 GM/DL    Hematocrit 44.0 42 - 52 %    MCV 96.5 78 - 100 FL    MCH 31.1 (H) 27 - 31 PG    MCHC 32.3 32.0 - 36.0 %    RDW 12.7 11.7 - 14.9 %    Platelets 356 518 - 466 K/CU MM    MPV 9.7 7.5 - 11.1 FL    Differential Type AUTOMATED DIFFERENTIAL     Segs Relative 57.0 36 - 66 %    Lymphocytes % 29.1 24 - 44 %    Monocytes % 9.8 (H) 0 - 4 %    Eosinophils % 3.6 (H) 0 - 3 %    Basophils % 0.3 0 - 1 %    Segs Absolute 3.5 K/CU MM    Lymphocytes Absolute 1.8 K/CU MM    Monocytes Absolute 0.6 K/CU MM    Eosinophils Absolute 0.2 K/CU MM    Basophils Absolute 0.0 K/CU MM    Nucleated RBC % 0.0 %    Total Nucleated RBC 0.0 K/CU MM    Total Immature Neutrophil 0.01 K/CU MM    Immature Neutrophil % 0.2 0 - 0.43 %   Comprehensive Metabolic Panel w/ Reflex to MG   Result Value Ref Range    Sodium 134 (L) 135 - 145 MMOL/L    Potassium 4.1 3.5 - 5.1 MMOL/L    Chloride 98 (L) 99 - 110 mMol/L    CO2 24 21 - 32 MMOL/L    BUN 20 6 - 23 MG/DL    CREATININE 0.9 0.9 - 1.3 MG/DL    Glucose 265 (H) 70 - 99 MG/DL    Calcium 9.1 8.3 - 10.6 MG/DL    Alb 3.8 3.4 - 5.0 GM/DL    Total Protein 6.5 6.4 - 8.2 GM/DL    Total Bilirubin 0.3 0.0 - 1.0 MG/DL    ALT 12 10 - 40 U/L    AST 13 (L) 15 - 37 IU/L    Alkaline Phosphatase 63 40 - 129 IU/L    GFR Non-African American >60 >60 mL/min/1.73m2    GFR African American >60 >60 mL/min/1.73m2    Anion Gap 12 4 - 16   Troponin   Result Value Ref Range    Troponin T <0.010 <0.01 NG/ML   SPECIMEN REJECTION   Result Value Ref Range    Rejected Test PT     Reason for Rejection SPECIMEN QUANTITY NOT SUFFICIENT    Protime-INR   Result Value Ref Range    Protime 11.9 11.7 - 14.5 SECONDS    INR 0.98 INDEX   Magnesium   Result Value Ref Range    Magnesium 2.0 1.8 - 2.4 mg/dl   TSH without Reflex   Result Value Ref Range    TSH, High Sensitivity 1.680 0.270 - 4.20 uIu/ml   T4, free   Result Value Ref Range    T4 Free 1.38 0.9 - 1.8 NG/DL   POCT Glucose   Result Value Ref Range    POC Glucose 273 (H) 70 - 99 MG/DL   EKG 12 Lead   Result Value Ref Range    Ventricular Rate 57 BPM    Atrial Rate 39 BPM    QRS Duration 138 ms    Q-T Interval 468 ms    QTc Calculation (Bazett) 455 ms    R Axis 248 degrees    T Axis 32 degrees    Diagnosis       Undetermined rhythm  Right bundle branch block  Inferior infarct , age undetermined  Abnormal ECG  No previous ECGs available          Radiographs (if obtained):  [] The following radiograph was interpreted by myself in the absence of a radiologist:  [x] Radiologist's Report Reviewed:  Ct Head Wo Contrast    Result Date: 9/25/2020  EXAMINATION: CT OF THE HEAD WITHOUT CONTRAST  9/25/2020 7:21 pm TECHNIQUE: CT of the head was performed without the administration of intravenous contrast. Dose modulation, iterative reconstruction, and/or weight based adjustment of the mA/kV was utilized to reduce the radiation dose to as low as reasonably achievable. COMPARISON: None.  HISTORY: ORDERING SYSTEM PROVIDED HISTORY: CVA TECHNOLOGIST PROVIDED HISTORY: Has a \"code stroke\" or \"stroke alert\" been WITH CONTRAST; CTA OF THE NECK 9/25/2020 7:25 pm: TECHNIQUE: CTA of the head/brain was performed with the administration of intravenous contrast. Multiplanar reformatted images are provided for review. MIP images are provided for review. Dose modulation, iterative reconstruction, and/or weight based adjustment of the mA/kV was utilized to reduce the radiation dose to as low as reasonably achievable.; CTA of the neck was performed with the administration of intravenous contrast. Multiplanar reformatted images are provided for review. MIP images are provided for review. Stenosis of the internal carotid arteries measured using NASCET criteria. Dose modulation, iterative reconstruction, and/or weight based adjustment of the mA/kV was utilized to reduce the radiation dose to as low as reasonably achievable. COMPARISON: None. HISTORY: ORDERING SYSTEM PROVIDED HISTORY: cva TECHNOLOGIST PROVIDED HISTORY: Has a \"code stroke\" or \"stroke alert\" been called? ->Yes Reason for exam:->cva Reason for Exam: cva Acuity: Acute Type of Exam: Initial FINDINGS: CTA NECK: AORTIC ARCH/ARCH VESSELS: Mild atherosclerotic plaque at the aortic arch without aneurysm or dissection. Conventional 3 vessel arch anatomy. Mild atherosclerotic plaque involving the innominate and subclavian arteries without stenosis. CAROTID ARTERIES: The common carotid arteries are normal in caliber. Atherosclerotic plaque at the carotid bifurcations and bulbs causes 40% stenosis of the proximal internal carotid arteries. The external carotid arteries are patent. No dissection. VERTEBRAL ARTERIES: The left vertebral artery is normal in caliber. The right vertebral artery is hypoplastic with diminishing contrast enhancement along its length through the V2/V3 junction with no contrast seen in the V3 segment. SOFT TISSUES: The lung apices are clear. No cervical or superior mediastinal lymphadenopathy. The larynx and pharynx are unremarkable.   No acute abnormality of the salivary and thyroid glands. BONES: There is no acute fracture or suspect osseous lesion. Mild C5-6 and C6-7 degenerative disc disease is noted. CTA HEAD: ANTERIOR CIRCULATION: Calcification of the cavernous internal carotid arteries without significant stenosis. Anterior cerebral arteries are normal in caliber with hypoplasia of the right A1 segment. There is severe stenosis of bilateral right middle cerebral artery anterior M2 segments. POSTERIOR CIRCULATION: No contrast in the right vertebral artery. Left vertebral and basilar arteries are normal in caliber. There is severe focal stenosis of the P2 segment left posterior cerebral artery and moderate focal stenosis of the P2 segment right posterior cerebral artery. OTHER: No dural venous sinus thrombosis on this non-dedicated study. BRAIN: No mass effect or midline shift. No extra-axial fluid collection. The gray-white differentiation is maintained. 1. No large vessel occlusion of the intracranial arteries. 2. 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 3. 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 4. 40% stenosis of the proximal internal carotid arteries. 5. No significant left vertebral artery stenosis. Cta Head W Contrast    Result Date: 9/25/2020  EXAMINATION: CTA OF THE HEAD WITH CONTRAST; CTA OF THE NECK 9/25/2020 7:25 pm: TECHNIQUE: CTA of the head/brain was performed with the administration of intravenous contrast. Multiplanar reformatted images are provided for review. MIP images are provided for review. Dose modulation, iterative reconstruction, and/or weight based adjustment of the mA/kV was utilized to reduce the radiation dose to as low as reasonably achievable.; CTA of the neck was performed with the administration of intravenous contrast. Multiplanar reformatted images are provided for review.   MIP images are provided for review. Stenosis of the internal carotid arteries measured using NASCET criteria. Dose modulation, iterative reconstruction, and/or weight based adjustment of the mA/kV was utilized to reduce the radiation dose to as low as reasonably achievable. COMPARISON: None. HISTORY: ORDERING SYSTEM PROVIDED HISTORY: cva TECHNOLOGIST PROVIDED HISTORY: Has a \"code stroke\" or \"stroke alert\" been called? ->Yes Reason for exam:->cva Reason for Exam: cva Acuity: Acute Type of Exam: Initial FINDINGS: CTA NECK: AORTIC ARCH/ARCH VESSELS: Mild atherosclerotic plaque at the aortic arch without aneurysm or dissection. Conventional 3 vessel arch anatomy. Mild atherosclerotic plaque involving the innominate and subclavian arteries without stenosis. CAROTID ARTERIES: The common carotid arteries are normal in caliber. Atherosclerotic plaque at the carotid bifurcations and bulbs causes 40% stenosis of the proximal internal carotid arteries. The external carotid arteries are patent. No dissection. VERTEBRAL ARTERIES: The left vertebral artery is normal in caliber. The right vertebral artery is hypoplastic with diminishing contrast enhancement along its length through the V2/V3 junction with no contrast seen in the V3 segment. SOFT TISSUES: The lung apices are clear. No cervical or superior mediastinal lymphadenopathy. The larynx and pharynx are unremarkable. No acute abnormality of the salivary and thyroid glands. BONES: There is no acute fracture or suspect osseous lesion. Mild C5-6 and C6-7 degenerative disc disease is noted. CTA HEAD: ANTERIOR CIRCULATION: Calcification of the cavernous internal carotid arteries without significant stenosis. Anterior cerebral arteries are normal in caliber with hypoplasia of the right A1 segment. There is severe stenosis of bilateral right middle cerebral artery anterior M2 segments. POSTERIOR CIRCULATION: No contrast in the right vertebral artery.   Left vertebral and basilar arteries are normal in caliber. There is severe focal stenosis of the P2 segment left posterior cerebral artery and moderate focal stenosis of the P2 segment right posterior cerebral artery. OTHER: No dural venous sinus thrombosis on this non-dedicated study. BRAIN: No mass effect or midline shift. No extra-axial fluid collection. The gray-white differentiation is maintained. 1. No large vessel occlusion of the intracranial arteries. 2. 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 3. 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 4. 40% stenosis of the proximal internal carotid arteries. 5. No significant left vertebral artery stenosis. EKG (if obtained): (All EKG's are interpreted by myself in the absence of a cardiologist)  12 lead EKG per my interpretation:  Atrial Fibrillation with slow ventricular response at 57  Axis is   NW axis  QTc is  within an acceptable range  There is no specific T wave changes appreciated. There is no specific ST wave changes appreciated. RBBB  No STEMI    Prior EKG to compare with was available and atrial fibrillation with a right bundle branch block morphology was seen on prior. MDM:  Pt presents as above. Emergent conditions considered. Presentation prompted initial prehospital stroke alert. Patient was escorted to CT by myself. Noncontrasted CT head was with no large bleed per my read. Patient was brought back to trauma bay two and [place] was consulted per our telemetry neuro stroke program.  EKG with atrial fibrillation with slow ventricular response as above. Patient will not be a systemic TPA candidate as his symptoms were at least present around noon today. I did speak with [place] stroke neurologist, Dr. Cecilio Damon, and case was discussed at length. He agrees the patient is not a TPA candidate.   CTA imaging was negative for large vessel occlusion and he is recommending further work-up here as an inpatient for possible stroke with no acute intervention at this time. CBC without clinically significant derangement. CMP is with hyperglycemia without acidosis. Troponin negative. INR within normal limits. TSH and free T4 within normal limits. Magnesium within normal meds. Chest x-ray negative for acute cardiopulmonary process. Patient is much improved on reassessment and is closer to his baseline state of health per family members. Patient is still with persistent facial droop which is new and warrants further evaluation. Patient's heart rate during ED course is anywhere from the upper 30s to the low 50s and in persistent atrial fibrillation with slow ventricular response confirmed on repeat EKG. Given the degree of bradycardia decision made to consult cardiology for further evaluation. I did speak with Dr. Lennox Mars and he will have their team evaluate the patient in the morning. As patient is maintaining appropriate mental status, normal oxygen saturation on room air and is actually slightly hypertensive we will hold medication therapy at this time and will continue to assess on telemetry. Patient and family agreeable with admission for further evaluation for likely CVA and evaluation of his persistent bradycardia and altered mental status and transient hypoglycemia at home. Patient to be discussed with hospitalist and patient to be admitted to medicine. Questions sought and answered with the patient and family. They voice understanding and agree with plan. Instructed to return for any worsening or worrisome concerns. 1.  Physician evaluation performed at:  1917 on arrival with escort of patient to CT  2. Stroke alert called at:  Pre-hospital  3. CT results obtained at:  1931  4. Decision was made that TPA is NOT indicated in consultation with [facility] Stroke Neurologist, Dr. Jolie Conrad.     t-PA NOT given due to the following EXCLUSION CRITERIA:  [x] Administration of t-PA can not be initiated within 4.5 hours of onset of symptoms  [] Evidence of intracranial hemorrhage on pretreatment CT  [] Clinical presentation suggestive of subarachnoid hemorrhage (even with normal CT)  [] CT shows multilobar infarction (hypodensity of > 1/3 cerebral hemisphere)  [] Known intracranial neoplasm, arteriovenous malformation or aneurysm  [] Significant head trauma (with sustained loss of consciousness), intracranial, or  intraspinal surgery within past 3 months  [] *Blood pressure elevated (systolic > 121 mm Hg or diastolic > 192 mm Hg)   [] *Abnormal blood glucose (< 50 or > 400mg/dL)  [] Active internal bleeding  [] Known bleeding risk (including but not limited to below)   Heparin, argatroban, or bivalirudin received within 48 hours with     PTT > upper limit of normal   Platelet count < 487,974/BF4   Current or recent use of anticoagulants including but not limited to: Warfarin (Coumadin®) within 5 days or INR > 1.7     Apixiban (Eliquis®) within 48 hours**     Dabigatran (Pradaxa®) within 72 hours**     Enoxaparin (Lovenox®) within 24 hours**     Fondaparinux (Arixtra®) within 72 hours**     Rivaroxaban (Xarelto®) within 24 hours**     **For patients with normal renal function. Activity may be significantly   prolonged in the elderly or patients with renal dysfunction    *Remains thrombolytic eligible if BP/BG corrected while in treatment window    t-PA NOT given due to consideration of the following RELATIVE CONTRAINDICATIONS:  [] Prior ischemic stroke within last 3 months  [] Recent history of intracranial hemorrhage  [] Pregnancy  [] Current or recent use of: Prasugrel (Effient®) within last 7 days or Ticagrelor (Brilinta®) within last 5 days  [] Major surgery or serious trauma within last 14 days  [] Arterial puncture at non-compressible site or lumbar puncture within last 7 days    Note: Patient undergoing LP within last 24 hours may be at higher risk.  Assess  for signs of traumatic or repeated punctures. [] Gastrointestinal or urinary tract hemorrhage within last 21 days  [] Myocardial infarction involving left anterior myocardium within last 3 months  [] Suspected or known infective endocarditis or pericarditis    t-PA NOT given due to consideration of the following RELATIVE CONTRAINDICATIONS in those patients with last known well within 3-4.5 hours:  []Taking an oral anticoagulant (apixaban, dabigatran, edoxaban, rivaroxaban) other than warfarin regardless of time since last ose. Note: This does not include warfarn. A patient on [place] remains eligible if INR less than or equal to 1.7    5. Patient will be dispositioned to [facility]. Clinical Impression:  1. Altered mental status, unspecified altered mental status type    2. Facial droop    3. Hypoglycemia    4.  Bradycardia      (Please note that portions of this note may have been completed with a voice recognition program. Efforts were made to edit the dictations but occasionally words are mis-transcribed.)    MD Cara Bedolla MD  09/25/20 1712

## 2020-09-25 NOTE — ED NOTES
Bed: 02TR-02  Expected date:   Expected time:   Means of arrival:   Comments:  Stroke alert     Christy Mejia  09/25/20 1919

## 2020-09-26 ENCOUNTER — APPOINTMENT (OUTPATIENT)
Dept: MRI IMAGING | Age: 85
DRG: 064 | End: 2020-09-26
Payer: MEDICARE

## 2020-09-26 ENCOUNTER — APPOINTMENT (OUTPATIENT)
Dept: CT IMAGING | Age: 85
DRG: 064 | End: 2020-09-26
Payer: MEDICARE

## 2020-09-26 LAB
ALBUMIN SERPL-MCNC: 3.9 GM/DL (ref 3.4–5)
ALP BLD-CCNC: 67 IU/L (ref 40–129)
ALT SERPL-CCNC: 12 U/L (ref 10–40)
AMMONIA: 60 UMOL/L (ref 16–60)
AMPHETAMINES: NEGATIVE
ANION GAP SERPL CALCULATED.3IONS-SCNC: 15 MMOL/L (ref 4–16)
AST SERPL-CCNC: 15 IU/L (ref 15–37)
BACTERIA: NEGATIVE /HPF
BARBITURATE SCREEN URINE: NEGATIVE
BENZODIAZEPINE SCREEN, URINE: NEGATIVE
BILIRUB SERPL-MCNC: 0.7 MG/DL (ref 0–1)
BILIRUBIN DIRECT: 0.2 MG/DL (ref 0–0.3)
BILIRUBIN URINE: NEGATIVE MG/DL
BILIRUBIN, INDIRECT: 0.5 MG/DL (ref 0–0.7)
BLOOD, URINE: ABNORMAL
BUN BLDV-MCNC: 17 MG/DL (ref 6–23)
CALCIUM SERPL-MCNC: 9.2 MG/DL (ref 8.3–10.6)
CANNABINOID SCREEN URINE: NEGATIVE
CHLORIDE BLD-SCNC: 102 MMOL/L (ref 99–110)
CHOLESTEROL: 203 MG/DL
CLARITY: ABNORMAL
CO2: 22 MMOL/L (ref 21–32)
COCAINE METABOLITE: NEGATIVE
COLOR: ABNORMAL
CREAT SERPL-MCNC: 0.9 MG/DL (ref 0.9–1.3)
EKG ATRIAL RATE: 32 BPM
EKG ATRIAL RATE: 39 BPM
EKG DIAGNOSIS: NORMAL
EKG DIAGNOSIS: NORMAL
EKG Q-T INTERVAL: 468 MS
EKG Q-T INTERVAL: 484 MS
EKG QRS DURATION: 134 MS
EKG QRS DURATION: 138 MS
EKG QTC CALCULATION (BAZETT): 432 MS
EKG QTC CALCULATION (BAZETT): 455 MS
EKG R AXIS: 248 DEGREES
EKG R AXIS: 248 DEGREES
EKG T AXIS: 10 DEGREES
EKG T AXIS: 32 DEGREES
EKG VENTRICULAR RATE: 48 BPM
EKG VENTRICULAR RATE: 57 BPM
ESTIMATED AVERAGE GLUCOSE: 194 MG/DL
FOLATE: 8.9 NG/ML (ref 3.1–17.5)
GFR AFRICAN AMERICAN: >60 ML/MIN/1.73M2
GFR NON-AFRICAN AMERICAN: >60 ML/MIN/1.73M2
GLUCOSE BLD-MCNC: 123 MG/DL (ref 70–99)
GLUCOSE BLD-MCNC: 146 MG/DL (ref 70–99)
GLUCOSE BLD-MCNC: 147 MG/DL (ref 70–99)
GLUCOSE BLD-MCNC: 150 MG/DL (ref 70–99)
GLUCOSE BLD-MCNC: 202 MG/DL (ref 70–99)
GLUCOSE, URINE: NEGATIVE MG/DL
HBA1C MFR BLD: 8.4 % (ref 4.2–6.3)
HCT VFR BLD CALC: 47.6 % (ref 42–52)
HDLC SERPL-MCNC: 47 MG/DL
HEMOGLOBIN: 14.9 GM/DL (ref 13.5–18)
KETONES, URINE: NEGATIVE MG/DL
LDL CHOLESTEROL DIRECT: 131 MG/DL
LEUKOCYTE ESTERASE, URINE: NEGATIVE
MCH RBC QN AUTO: 30.3 PG (ref 27–31)
MCHC RBC AUTO-ENTMCNC: 31.3 % (ref 32–36)
MCV RBC AUTO: 96.7 FL (ref 78–100)
NITRITE URINE, QUANTITATIVE: NEGATIVE
OPIATES, URINE: NEGATIVE
OXYCODONE: NEGATIVE
PDW BLD-RTO: 12.7 % (ref 11.7–14.9)
PH, URINE: 6 (ref 5–8)
PHENCYCLIDINE, URINE: NEGATIVE
PLATELET # BLD: 255 K/CU MM (ref 140–440)
PMV BLD AUTO: 9.3 FL (ref 7.5–11.1)
POTASSIUM SERPL-SCNC: 3.9 MMOL/L (ref 3.5–5.1)
PROTEIN UA: 30 MG/DL
RBC # BLD: 4.92 M/CU MM (ref 4.6–6.2)
RBC URINE: 1143 /HPF (ref 0–3)
SODIUM BLD-SCNC: 139 MMOL/L (ref 135–145)
SPECIFIC GRAVITY UA: 1.02 (ref 1–1.03)
TOTAL PROTEIN: 6.4 GM/DL (ref 6.4–8.2)
TRICHOMONAS: ABNORMAL /HPF
TRIGL SERPL-MCNC: 153 MG/DL
TROPONIN T: <0.01 NG/ML
TROPONIN T: <0.01 NG/ML
UROBILINOGEN, URINE: NORMAL MG/DL (ref 0.2–1)
VITAMIN B-12: 278.3 PG/ML (ref 211–911)
WBC # BLD: 7.5 K/CU MM (ref 4–10.5)
WBC UA: 77 /HPF (ref 0–2)

## 2020-09-26 PROCEDURE — 97166 OT EVAL MOD COMPLEX 45 MIN: CPT

## 2020-09-26 PROCEDURE — 85027 COMPLETE CBC AUTOMATED: CPT

## 2020-09-26 PROCEDURE — 80307 DRUG TEST PRSMV CHEM ANLYZR: CPT

## 2020-09-26 PROCEDURE — 70450 CT HEAD/BRAIN W/O DYE: CPT

## 2020-09-26 PROCEDURE — 6370000000 HC RX 637 (ALT 250 FOR IP): Performed by: HOSPITALIST

## 2020-09-26 PROCEDURE — 2140000000 HC CCU INTERMEDIATE R&B

## 2020-09-26 PROCEDURE — 93010 ELECTROCARDIOGRAM REPORT: CPT | Performed by: INTERNAL MEDICINE

## 2020-09-26 PROCEDURE — 51702 INSERT TEMP BLADDER CATH: CPT

## 2020-09-26 PROCEDURE — 82607 VITAMIN B-12: CPT

## 2020-09-26 PROCEDURE — 82746 ASSAY OF FOLIC ACID SERUM: CPT

## 2020-09-26 PROCEDURE — 6360000002 HC RX W HCPCS: Performed by: INTERNAL MEDICINE

## 2020-09-26 PROCEDURE — 70551 MRI BRAIN STEM W/O DYE: CPT

## 2020-09-26 PROCEDURE — 92610 EVALUATE SWALLOWING FUNCTION: CPT

## 2020-09-26 PROCEDURE — 6360000002 HC RX W HCPCS: Performed by: HOSPITALIST

## 2020-09-26 PROCEDURE — 82248 BILIRUBIN DIRECT: CPT

## 2020-09-26 PROCEDURE — 2580000003 HC RX 258: Performed by: HOSPITALIST

## 2020-09-26 PROCEDURE — 82140 ASSAY OF AMMONIA: CPT

## 2020-09-26 PROCEDURE — 99222 1ST HOSP IP/OBS MODERATE 55: CPT | Performed by: INTERNAL MEDICINE

## 2020-09-26 PROCEDURE — 6370000000 HC RX 637 (ALT 250 FOR IP): Performed by: PSYCHIATRY & NEUROLOGY

## 2020-09-26 PROCEDURE — 94761 N-INVAS EAR/PLS OXIMETRY MLT: CPT

## 2020-09-26 PROCEDURE — 83036 HEMOGLOBIN GLYCOSYLATED A1C: CPT

## 2020-09-26 PROCEDURE — 80061 LIPID PANEL: CPT

## 2020-09-26 PROCEDURE — 36415 COLL VENOUS BLD VENIPUNCTURE: CPT

## 2020-09-26 PROCEDURE — 82962 GLUCOSE BLOOD TEST: CPT

## 2020-09-26 PROCEDURE — 6360000002 HC RX W HCPCS: Performed by: PSYCHIATRY & NEUROLOGY

## 2020-09-26 PROCEDURE — 80053 COMPREHEN METABOLIC PANEL: CPT

## 2020-09-26 PROCEDURE — 6370000000 HC RX 637 (ALT 250 FOR IP): Performed by: NURSE PRACTITIONER

## 2020-09-26 PROCEDURE — 97162 PT EVAL MOD COMPLEX 30 MIN: CPT

## 2020-09-26 PROCEDURE — 83721 ASSAY OF BLOOD LIPOPROTEIN: CPT

## 2020-09-26 PROCEDURE — 2580000003 HC RX 258: Performed by: PSYCHIATRY & NEUROLOGY

## 2020-09-26 PROCEDURE — 81001 URINALYSIS AUTO W/SCOPE: CPT

## 2020-09-26 PROCEDURE — 84484 ASSAY OF TROPONIN QUANT: CPT

## 2020-09-26 RX ORDER — ONDANSETRON 2 MG/ML
4 INJECTION INTRAMUSCULAR; INTRAVENOUS EVERY 6 HOURS PRN
Status: DISCONTINUED | OUTPATIENT
Start: 2020-09-26 | End: 2020-09-30 | Stop reason: HOSPADM

## 2020-09-26 RX ORDER — POLYETHYLENE GLYCOL 3350 17 G/17G
17 POWDER, FOR SOLUTION ORAL DAILY PRN
Status: DISCONTINUED | OUTPATIENT
Start: 2020-09-26 | End: 2020-09-30 | Stop reason: HOSPADM

## 2020-09-26 RX ORDER — TAMSULOSIN HYDROCHLORIDE 0.4 MG/1
0.4 CAPSULE ORAL DAILY
Status: DISCONTINUED | OUTPATIENT
Start: 2020-09-26 | End: 2020-09-30 | Stop reason: HOSPADM

## 2020-09-26 RX ORDER — LABETALOL HYDROCHLORIDE 5 MG/ML
10 INJECTION, SOLUTION INTRAVENOUS EVERY 10 MIN PRN
Status: DISCONTINUED | OUTPATIENT
Start: 2020-09-26 | End: 2020-09-30 | Stop reason: HOSPADM

## 2020-09-26 RX ORDER — PROMETHAZINE HYDROCHLORIDE 25 MG/1
12.5 TABLET ORAL EVERY 6 HOURS PRN
Status: DISCONTINUED | OUTPATIENT
Start: 2020-09-26 | End: 2020-09-30 | Stop reason: HOSPADM

## 2020-09-26 RX ORDER — SODIUM CHLORIDE 0.9 % (FLUSH) 0.9 %
10 SYRINGE (ML) INJECTION EVERY 12 HOURS SCHEDULED
Status: DISCONTINUED | OUTPATIENT
Start: 2020-09-26 | End: 2020-09-30 | Stop reason: HOSPADM

## 2020-09-26 RX ORDER — CLOPIDOGREL BISULFATE 75 MG/1
75 TABLET ORAL DAILY
Status: DISCONTINUED | OUTPATIENT
Start: 2020-09-26 | End: 2020-09-26

## 2020-09-26 RX ORDER — SODIUM CHLORIDE 0.9 % (FLUSH) 0.9 %
10 SYRINGE (ML) INJECTION PRN
Status: DISCONTINUED | OUTPATIENT
Start: 2020-09-26 | End: 2020-09-30 | Stop reason: HOSPADM

## 2020-09-26 RX ORDER — ASPIRIN 300 MG/1
300 SUPPOSITORY RECTAL ONCE
Status: COMPLETED | OUTPATIENT
Start: 2020-09-26 | End: 2020-09-26

## 2020-09-26 RX ORDER — NICOTINE POLACRILEX 4 MG
15 LOZENGE BUCCAL PRN
Status: DISCONTINUED | OUTPATIENT
Start: 2020-09-26 | End: 2020-09-30 | Stop reason: HOSPADM

## 2020-09-26 RX ORDER — ASPIRIN 300 MG/1
300 SUPPOSITORY RECTAL DAILY
Status: DISCONTINUED | OUTPATIENT
Start: 2020-09-26 | End: 2020-09-30 | Stop reason: HOSPADM

## 2020-09-26 RX ORDER — ASPIRIN 81 MG/1
81 TABLET ORAL DAILY
Status: DISCONTINUED | OUTPATIENT
Start: 2020-09-26 | End: 2020-09-30 | Stop reason: HOSPADM

## 2020-09-26 RX ORDER — ATORVASTATIN CALCIUM 20 MG/1
20 TABLET, FILM COATED ORAL NIGHTLY
Status: DISCONTINUED | OUTPATIENT
Start: 2020-09-26 | End: 2020-09-30 | Stop reason: HOSPADM

## 2020-09-26 RX ORDER — DEXTROSE MONOHYDRATE 25 G/50ML
12.5 INJECTION, SOLUTION INTRAVENOUS PRN
Status: DISCONTINUED | OUTPATIENT
Start: 2020-09-26 | End: 2020-09-30 | Stop reason: HOSPADM

## 2020-09-26 RX ORDER — CLOPIDOGREL BISULFATE 75 MG/1
75 TABLET ORAL DAILY
Status: DISCONTINUED | OUTPATIENT
Start: 2020-09-26 | End: 2020-09-30 | Stop reason: HOSPADM

## 2020-09-26 RX ORDER — DEXTROSE MONOHYDRATE 50 MG/ML
100 INJECTION, SOLUTION INTRAVENOUS PRN
Status: DISCONTINUED | OUTPATIENT
Start: 2020-09-26 | End: 2020-09-30 | Stop reason: HOSPADM

## 2020-09-26 RX ORDER — HYDRALAZINE HYDROCHLORIDE 20 MG/ML
10 INJECTION INTRAMUSCULAR; INTRAVENOUS EVERY 4 HOURS PRN
Status: DISCONTINUED | OUTPATIENT
Start: 2020-09-26 | End: 2020-09-26

## 2020-09-26 RX ADMIN — INSULIN LISPRO 2 UNITS: 100 INJECTION, SOLUTION INTRAVENOUS; SUBCUTANEOUS at 08:57

## 2020-09-26 RX ADMIN — ENOXAPARIN SODIUM 90 MG: 100 INJECTION SUBCUTANEOUS at 06:12

## 2020-09-26 RX ADMIN — ATORVASTATIN CALCIUM 20 MG: 20 TABLET, FILM COATED ORAL at 22:12

## 2020-09-26 RX ADMIN — INSULIN LISPRO 4 UNITS: 100 INJECTION, SOLUTION INTRAVENOUS; SUBCUTANEOUS at 11:46

## 2020-09-26 RX ADMIN — ASPIRIN 300 MG: 300 SUPPOSITORY RECTAL at 09:32

## 2020-09-26 RX ADMIN — ENOXAPARIN SODIUM 90 MG: 100 INJECTION SUBCUTANEOUS at 22:12

## 2020-09-26 RX ADMIN — HYDRALAZINE HYDROCHLORIDE 10 MG: 20 INJECTION INTRAMUSCULAR; INTRAVENOUS at 09:32

## 2020-09-26 RX ADMIN — CLOPIDOGREL BISULFATE 75 MG: 75 TABLET ORAL at 19:17

## 2020-09-26 RX ADMIN — SODIUM CHLORIDE, PRESERVATIVE FREE 10 ML: 5 INJECTION INTRAVENOUS at 09:00

## 2020-09-26 RX ADMIN — LEVETIRACETAM 500 MG: 100 INJECTION, SOLUTION INTRAVENOUS at 19:17

## 2020-09-26 RX ADMIN — ASPIRIN 300 MG: 300 SUPPOSITORY RECTAL at 01:45

## 2020-09-26 ASSESSMENT — PAIN SCALES - PAIN ASSESSMENT IN ADVANCED DEMENTIA (PAINAD)
TOTALSCORE: 0
FACIALEXPRESSION: 0
BREATHING: 0
NEGVOCALIZATION: 0
BODYLANGUAGE: 0
FACIALEXPRESSION: 0
BODYLANGUAGE: 0
CONSOLABILITY: 0
BREATHING: 0
CONSOLABILITY: 0
NEGVOCALIZATION: 0
TOTALSCORE: 0

## 2020-09-26 NOTE — ED NOTES
Notified Dr. Kylah Chu of HR of 35 to 42. Verbal order for repeat EKG. Completed by this nurse and provided to Dr. Kylah Chu.      Shwetha Acosta RN  09/25/20 2029

## 2020-09-26 NOTE — PROGRESS NOTES
Swallow evaluation completed at bedside. Some coughing noted at end of screen. Will keep pt NPO until speech further evaluates pt.  Mendy Morales RN

## 2020-09-26 NOTE — H&P
[address] HOSPITALIST History & Physical      PCP: Supa Martini MD    Date of Admission: 9/25/2020    of Service: Pt seen/examined on 09/25/20 and Admitted to Inpatient    Hx taken from daughter and wife    Chief Complaint: Altered mental status  History Of Present Illness: The patient is a 80 y.o. male PMHx diabetes  Patient's family states that patient started behaving differently this afternoon. He did not eat his lunch. He was not talking much. He is not eating as much as he normally does. They described him as not being with it around 5 PM.  He was not making eye contact which was unusual for him and he was staring up at the ceiling. They noticed that his blood sugars were in the 50s. They gave him juice and food. They continued to have difficulty getting his attention. He still was not speaking. He also felt that his hands were stiff. Around 6 PM they noticed that he had some left-sided facial droop. They called the paramedics. In the ER stroke alert was called. Case was discussed with [facility] who felt that patient was outside the TPA window. His glucose had improved into the 200s. Family states that he started to perk up and was returning close to baseline. ER states that patient's heart rate dropped into the 30s. He does have a history of chronic A. fib. Family denies that he has had any bleeding history. He has had occasional fall. Past Medical History:    History reviewed. No pertinent past medical history. PastSurgical History:    History reviewed. No pertinent surgical history. Medications Prior to Admission:    Prior to Admission medications    Medication Sig Start Date End Date Taking?  Authorizing Provider   oxybutynin (DITROPAN-XL) 10 MG extended release tablet TAKE 1 TABLET BY MOUTH EVERY DAY 9/2/20   Nevaeh Castro MD   metFORMIN (GLUCOPHAGE) 500 MG tablet Take 1 tablet by mouth daily Take with largest meal of the day 7/16/20   Supa Martini MD Compression Stockings MISC by Does not apply route Use daily for treatment of leg edema 7/16/20   Laura Pelletier MD   Petrolatum-Zinc Oxide (PHYTOPLEX Z-GUARD EX) Apply topically    Historical Provider, MD   blood glucose monitor kit and supplies Test 1 times a day & as needed for symptoms of irregular blood glucose. 5/12/20   Laura Pelletier MD   blood glucose monitor strips Test 1 times a day & as needed for symptoms of irregular blood glucose. 5/12/20   Laura Pelletier MD   Lancets MISC 1 each by Does not apply route daily 5/12/20   Laura Pelletier MD       Allergies:    Patient has no known allergies. Social History:    The patient currently lives at home. Uses walker. TOBACCO:   reports that he quit smoking about 32 years ago. He has never used smokeless tobacco.  ETOH:   reports current alcohol use. History:  Positive as follows:        Problem Relation Age of Onset   Jose Martin Valerio Cancer Sister     Diabetes Brother        REVIEW OF SYSTEMS:   Pertinent positives and negatives as noted in the HPI and ROS. All other systems reviewed and negative. Review of Systems   Constitutional: Negative for chills, diaphoresis and fever. HENT: Negative for rhinorrhea and sore throat. Eyes: Positive for redness (at least 1-2 weeks) and visual disturbance. Respiratory: Positive for cough (dry cough) and shortness of breath (occasionally short of breath). Cardiovascular: Negative for chest pain and palpitations. Gastrointestinal: Negative for abdominal pain, blood in stool, nausea and vomiting. Genitourinary: Positive for difficulty urinating. Negative for hematuria. Musculoskeletal: Negative for arthralgias. Skin: Negative for rash and wound. Neurological: Negative for dizziness and light-headedness. Psychiatric/Behavioral: Positive for confusion. The patient is not nervous/anxious.       PHYSICAL EXAM:  BP (!) 179/79   Pulse 51   Resp 24   Ht 5' 7.99\" (1.727 m)   Wt 197 lb 5 oz (89.5 kg) SpO2 98%   BMI 30.01 kg/m²   Physical Exam  Constitutional:       General: He is not in acute distress. Appearance: He is not ill-appearing, toxic-appearing or diaphoretic. HENT:      Head: Normocephalic and atraumatic. Nose: No rhinorrhea. Mouth/Throat:      Mouth: Mucous membranes are moist.   Eyes:      General:         Right eye: No discharge. Left eye: No discharge. Comments: Redness in both eyes     Neck:      Musculoskeletal: No muscular tenderness. Cardiovascular:      Rate and Rhythm: Bradycardia present. Rhythm irregular. Pulses: Normal pulses. Heart sounds: No murmur. No gallop. Pulmonary:      Effort: Pulmonary effort is normal. No respiratory distress. Breath sounds: No wheezing, rhonchi or rales. Abdominal:      General: Abdomen is flat. There is no distension. Palpations: Abdomen is soft. Tenderness: There is no abdominal tenderness. There is no guarding. Musculoskeletal:         General: No swelling or tenderness. Lymphadenopathy:      Cervical: No cervical adenopathy. Skin:     General: Skin is warm. Findings: No rash. Neurological:      Mental Status: He is alert. Comments: May have slight droop in left side of face  Oriented to person   Psychiatric:         Mood and Affect: Mood normal.         Behavior: Behavior normal.           Imaging:    Ct Head Wo Contrast    Result Date: 9/25/2020  EXAMINATION: CT OF THE HEAD WITHOUT CONTRAST  9/25/2020 7:21 pm TECHNIQUE: CT of the head was performed without the administration of intravenous contrast. Dose modulation, iterative reconstruction, and/or weight based adjustment of the mA/kV was utilized to reduce the radiation dose to as low as reasonably achievable. COMPARISON: None.  HISTORY: ORDERING SYSTEM PROVIDED HISTORY: CVA TECHNOLOGIST PROVIDED HISTORY: Has a \"code stroke\" or \"stroke alert\" been called?-> yes Reason for exam:-> CVA Reason for Exam: CVA Acuity: Acute Type of was performed with the administration of intravenous contrast. Multiplanar reformatted images are provided for review. MIP images are provided for review. Dose modulation, iterative reconstruction, and/or weight based adjustment of the mA/kV was utilized to reduce the radiation dose to as low as reasonably achievable.; CTA of the neck was performed with the administration of intravenous contrast. Multiplanar reformatted images are provided for review. MIP images are provided for review. Stenosis of the internal carotid arteries measured using NASCET criteria. Dose modulation, iterative reconstruction, and/or weight based adjustment of the mA/kV was utilized to reduce the radiation dose to as low as reasonably achievable. COMPARISON: None. HISTORY: ORDERING SYSTEM PROVIDED HISTORY: cva TECHNOLOGIST PROVIDED HISTORY: Has a \"code stroke\" or \"stroke alert\" been called? ->Yes Reason for exam:->cva Reason for Exam: cva Acuity: Acute Type of Exam: Initial FINDINGS: CTA NECK: AORTIC ARCH/ARCH VESSELS: Mild atherosclerotic plaque at the aortic arch without aneurysm or dissection. Conventional 3 vessel arch anatomy. Mild atherosclerotic plaque involving the innominate and subclavian arteries without stenosis. CAROTID ARTERIES: The common carotid arteries are normal in caliber. Atherosclerotic plaque at the carotid bifurcations and bulbs causes 40% stenosis of the proximal internal carotid arteries. The external carotid arteries are patent. No dissection. VERTEBRAL ARTERIES: The left vertebral artery is normal in caliber. The right vertebral artery is hypoplastic with diminishing contrast enhancement along its length through the V2/V3 junction with no contrast seen in the V3 segment. SOFT TISSUES: The lung apices are clear. No cervical or superior mediastinal lymphadenopathy. The larynx and pharynx are unremarkable. No acute abnormality of the salivary and thyroid glands.  BONES: There is no acute fracture or suspect osseous lesion. Mild C5-6 and C6-7 degenerative disc disease is noted. CTA HEAD: ANTERIOR CIRCULATION: Calcification of the cavernous internal carotid arteries without significant stenosis. Anterior cerebral arteries are normal in caliber with hypoplasia of the right A1 segment. There is severe stenosis of bilateral right middle cerebral artery anterior M2 segments. POSTERIOR CIRCULATION: No contrast in the right vertebral artery. Left vertebral and basilar arteries are normal in caliber. There is severe focal stenosis of the P2 segment left posterior cerebral artery and moderate focal stenosis of the P2 segment right posterior cerebral artery. OTHER: No dural venous sinus thrombosis on this non-dedicated study. BRAIN: No mass effect or midline shift. No extra-axial fluid collection. The gray-white differentiation is maintained. 1. No large vessel occlusion of the intracranial arteries. 2. 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 3. 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 4. 40% stenosis of the proximal internal carotid arteries. 5. No significant left vertebral artery stenosis. Cta Head W Contrast    Result Date: 9/25/2020  EXAMINATION: CTA OF THE HEAD WITH CONTRAST; CTA OF THE NECK 9/25/2020 7:25 pm: TECHNIQUE: CTA of the head/brain was performed with the administration of intravenous contrast. Multiplanar reformatted images are provided for review. MIP images are provided for review. Dose modulation, iterative reconstruction, and/or weight based adjustment of the mA/kV was utilized to reduce the radiation dose to as low as reasonably achievable.; CTA of the neck was performed with the administration of intravenous contrast. Multiplanar reformatted images are provided for review. MIP images are provided for review. Stenosis of the internal carotid arteries measured using NASCET criteria. Dose modulation, iterative reconstruction, and/or weight based adjustment of the mA/kV was utilized to reduce the radiation dose to as low as reasonably achievable. COMPARISON: None. HISTORY: ORDERING SYSTEM PROVIDED HISTORY: cva TECHNOLOGIST PROVIDED HISTORY: Has a \"code stroke\" or \"stroke alert\" been called? ->Yes Reason for exam:->cva Reason for Exam: cva Acuity: Acute Type of Exam: Initial FINDINGS: CTA NECK: AORTIC ARCH/ARCH VESSELS: Mild atherosclerotic plaque at the aortic arch without aneurysm or dissection. Conventional 3 vessel arch anatomy. Mild atherosclerotic plaque involving the innominate and subclavian arteries without stenosis. CAROTID ARTERIES: The common carotid arteries are normal in caliber. Atherosclerotic plaque at the carotid bifurcations and bulbs causes 40% stenosis of the proximal internal carotid arteries. The external carotid arteries are patent. No dissection. VERTEBRAL ARTERIES: The left vertebral artery is normal in caliber. The right vertebral artery is hypoplastic with diminishing contrast enhancement along its length through the V2/V3 junction with no contrast seen in the V3 segment. SOFT TISSUES: The lung apices are clear. No cervical or superior mediastinal lymphadenopathy. The larynx and pharynx are unremarkable. No acute abnormality of the salivary and thyroid glands. BONES: There is no acute fracture or suspect osseous lesion. Mild C5-6 and C6-7 degenerative disc disease is noted. CTA HEAD: ANTERIOR CIRCULATION: Calcification of the cavernous internal carotid arteries without significant stenosis. Anterior cerebral arteries are normal in caliber with hypoplasia of the right A1 segment. There is severe stenosis of bilateral right middle cerebral artery anterior M2 segments. POSTERIOR CIRCULATION: No contrast in the right vertebral artery. Left vertebral and basilar arteries are normal in caliber.   There is severe focal stenosis of the P2 segment left posterior cerebral artery and moderate focal stenosis of the P2 segment right posterior cerebral artery. OTHER: No dural venous sinus thrombosis on this non-dedicated study. BRAIN: No mass effect or midline shift. No extra-axial fluid collection. The gray-white differentiation is maintained. 1. No large vessel occlusion of the intracranial arteries. 2. 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 3. 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 4. 40% stenosis of the proximal internal carotid arteries. 5. No significant left vertebral artery stenosis. EKG:   Appears to be in A. fib    CBC   Recent Labs     09/25/20 1942   WBC 6.1   HGB 14.2   HCT 44.0         RENAL  Recent Labs     09/25/20 1942   *   K 4.1   CL 98*   CO2 24   BUN 20   CREATININE 0.9     LFT'S  Recent Labs     09/25/20 1942   AST 13*   ALT 12   BILITOT 0.3   ALKPHOS 63     COAG  Recent Labs     09/25/20 2006   INR 0.98     CARDIAC ENZYMES  Recent Labs     09/25/20 1942   TROPONINT <0.010       U/A:  No results found for: NITRITE, COLORU, WBCUA, RBCUA, MUCUS, BACTERIA, CLARITYU, SPECGRAV, LEUKOCYTESUR, BLOODU, GLUCOSEU, AMORPHOUS    ABG  No results found for: FVV2QHG, BEART, J4UBQDQM, PHART, THGBART, ZNW4ZJT, PO2ART, BTE1OKW        There are no active hospital problems to display for this patient.     [id number] Score for Atrial Fibrillation Stroke Risk   Risk   Factors  Component Value   C CHF No 0   H HTN No 0   A2 Age >= 76 Yes,  (80 y.o.) 2   D DM Yes 1   S2 Prior Stroke/TIA No 0   V Vascular Disease No 0   A Age 74-69 No,  (80 y.o.) 0   Sc Sex male 0    [id number]  Score  3   Score last updated 9/26/20 9:16 AM EDT    Click here for a link to the UpToDate guideline \"Atrial Fibrillation: Anticoagulation therapy to prevent embolization    Disclaimer: Risk Score calculation is dependent on accuracy of patient problem list and past encounter diagnosis. CERTIFICATION    I certify that Uziel Coronel is expected to be hospitalized for >2 midnights based on the following assessment and plan:    ASSESSMENT/PLAN:    1. Left Facial Droop  -concern for stroke. Check MRI brain. Consult neurology. Patient has had a history of strokes in the past.  Is not clear why he is not on anticoagulation for his A. fib. On aspirin and statin. Order PT/OT/SLP. Check echo. CTA head and neck: Right vertebral artery with occlusion; bilateral posterior cerebral artery stenosis; 40% stenosis of proximal ICA. CT head: Multifocal probably remote lacunar strokes  2. Acute Metabolic Encephalopathy  -possibly due to stroke versus hypoglycemia. Check reversible causes such as TSH, PCO2, UTI. Is also not clear if patient may have had a bradycardia episode which is affecting his mental status. 3. Hypoglycemia, in the setting of diabetes mellitus  -unclear why patient sugars drop. Family states he was not eating that much that morning. Patient is only on metformin, which we will hold at this time. Placed on sliding scale insulin. Placed on hypoglycemia protocol. 4. Bradycardia  - consult cardiology. May have been symptomatic if this was part of why he is mental status was altered at home. Check serial troponins. Check TSH.  5. HTN  -allow for permissive hypertension. Labetalol as needed  6. Afib  - not on rate or rhythm control medication. Chads Vasc is 3. Does have occasional falls however family does not feel that this is regular for him. Does have multiple remote lacunar infarcts. Will give a dose of therapeutic Lovenox now. Will defer to neurology and cardiology to see if this needs to be continued.        DVT Prophylaxis: Lovenox  Diet: Diet NPO Effective Now  Code Status: DNR-CCA  POA: Yoel Ivey MD

## 2020-09-26 NOTE — PROGRESS NOTES
This RN attempted to straight cath Pt for urinalysis and drug screen. No urine at this time. Paula Hoover RN waited 5 minutes and tried to straight cath Pt no urine at this time. With the second straight cath Pt had some blood on removal the catheter.   Pt has been NPO and refused to do a bedside swallow \"stating that he was too tired\"

## 2020-09-26 NOTE — PROGRESS NOTES
Speech Language Pathology  Facility/Department: [facility]   CLINICAL BEDSIDE SWALLOW EVALUATION    NAME: Rizwan Pickard  : 1929  MRN: [id number]    IMPRESSIONS: Rizwan Pickard was referred for a bedside swallow evaluation after being admitted to [facility] with AMS and left facial droop concerning for possible stroke. Medical hx includes DM, HTN, afib, dementia. No known history of dysphagia prior to admission. Pt seen for evaluation seated upright in bed, alert. He was irritable but fairly cooperative throughout. He did not follow directions to complete oral mechanism examination. No obvious facial asymmetry noted at rest. He was presented with PO trials of ice chips, thin liquids via tsp/cup/straw, puree, soft solids, and regular solids. Oral stage mildly impaired with slow/reduced mastication, adequate AP transit/clearance. Suspect pharyngeal dysphagia with variably delayed swallow initiation and decreased laryngeal elevation. No overt s/s aspiration noted across all trials. Recommend initiation of dysphagia II diet with thin liquid and general aspiration precautions. Recommend giving pills in pureed or pudding consistency (whole okay). SLP will follow. ADMISSION DATE: 2020  ADMITTING DIAGNOSIS: has Persistent atrial fibrillation; Abnormal EKG; Dementia without behavioral disturbance ([address]  Loose stools; and Stroke-like episode on their problem list.  ONSET DATE: this admission    Recent Chest Xray/CT of Chest: see chart    Date of Eval: 2020  Evaluating Therapist: Diane Ledezma    Current Diet level:  Current Diet : NPO  Current Liquid Diet : NPO      Primary Complaint  Patient Complaint: does not state    Pain:       Reason for Referral  Rizwan Pickard was referred for a bedside swallow evaluation to assess the efficiency of his swallow function, identify signs and symptoms of aspiration and make recommendations regarding safe dietary consistencies, effective compensatory strategies, and safe eating environment. Impression  Dysphagia Diagnosis: Mild oral stage dysphagia;Mild to moderate pharyngeal stage dysphagia  Dysphagia Outcome Severity Scale: Level 4: Mild moderate dysphagia- Intermittent supervision/cueing. One - two diet consistencies restricted     Treatment Plan  Requires SLP Intervention: Yes  Duration/Frequency of Treatment: 1-2x/week for LOS          Recommended Diet and Intervention  Diet Solids Recommendation: Dysphagia Minced and Moist (Dysphagia II)  Liquid Consistency Recommendation: Thin  Recommended Form of Meds: Meds in puree     Therapeutic Interventions: Diet tolerance monitoring;Patient/Family education    Compensatory Swallowing Strategies  Compensatory Swallowing Strategies: Upright as possible for all oral intake;Assist feed    Treatment/Goals  Short-term Goals  Timeframe for Short-term Goals: length of admission  Goal 1: Pt will tolerate dysphagia II diet/thin liquids with adequate oral manipulation and no s/s aspiration. Goal 2: Caregivers will indicate understanding of safe feeding protocol. General  Chart Reviewed: Yes  Behavior/Cognition: Alert;Confused(irritable)  Communication Observation: (limited verbalizations, cognitive communication deficits)  Dentition: Edentulous  Patient Positioning: Upright in bed  Baseline Vocal Quality: Normal  Prior Dysphagia History: none known prior to admission  Consistencies Administered: Reg solid; Dysphagia Minced and Moist (Dysphagia II); Dysphagia Pureed (Dysphagia I); Thin - cup; Thin - straw; Thin - teaspoon; Ice Chips           Vision/Hearing       Oral Motor Deficits  Oral/Motor  Oral Motor: (UCHE, refused participation)    Oral Phase Dysfunction  Oral Phase  Oral Phase: Exceptions     Indicators of Pharyngeal Phase Dysfunction   Pharyngeal Phase  Pharyngeal Phase: Exceptions    Prognosis  Prognosis  Prognosis for safe diet advancement: guarded    Education  Patient Education: recommendations/plan  Patient Education Response: No evidence of learning  Safety Devices in place: Yes  Type of devices:  All fall risk precautions in place       Therapy Time  SLP Individual Minutes  Time In: 1020  Time Out: [address]  Minutes: [place], [facility]-SLP  9/26/2020 10:59 AM

## 2020-09-26 NOTE — CONSULTS
Zafar Amato MD.  Section of General Neurology - Adult  Consult Note        Reason for Consult:    Requesting Physician:  No referring provider defined for this encounter. Thank you for your kind referral.    CHIEF COMPLAINT:  aphasia         HISTORY OF PRESENT ILLNESS:              The patient is a 80 y.o. male with a history of aphasia and confusion with facial droop. patient started behaving differently this afternoon. He did not eat his lunch. He was not talking much. He is not eating as much as he normally does. They described him as not being with it around 5 PM.  He was not making eye contact which was unusual for him and he was staring up at the ceiling. They noticed that his blood sugars were in the 50s. They gave him juice and food. They continued to have difficulty getting his attention. He still was not speaking. He also felt that his hands were stiff. Around 6 PM they noticed that he had some left-sided facial droop. They called the paramedics. In the ER stroke alert was called. Case was discussed with [place] who felt that patient was outside the TPA window. His glucose had improved into the 200s. Family states that he started to perk up and was returning close to baseline. ER states that patient's heart rate dropped into the 30s. He does have a history of chronic A. fib. Family denies that he has had any bleeding history. He has had occasional fall. CT brain positivie for multiple cerebral infarcts. Past Medical History:        Diagnosis Date    Diabetes [facility])      Past Surgical History:    History reviewed. No pertinent surgical history.   Current Medications:   Current Facility-Administered Medications: sodium chloride flush 0.9 % injection 10 mL, 10 mL, Intravenous, 2 times per day  sodium chloride flush 0.9 % injection 10 mL, 10 mL, Intravenous, PRN  polyethylene glycol (GLYCOLAX) packet 17 g, 17 g, Oral, Daily PRN  promethazine (PHENERGAN) tablet 12.5 mg, 12.5 mg, Oral, Q6H PRN **OR** ondansetron (ZOFRAN) injection 4 mg, 4 mg, Intravenous, Q6H PRN  atorvastatin (LIPITOR) tablet 20 mg, 20 mg, Oral, Nightly  aspirin EC tablet 81 mg, 81 mg, Oral, Daily **OR** aspirin suppository 300 mg, 300 mg, Rectal, Daily  labetalol (NORMODYNE;TRANDATE) injection 10 mg, 10 mg, Intravenous, Q10 Min PRN  insulin lispro (HUMALOG) injection vial 0-12 Units, 0-12 Units, Subcutaneous, TID WC  insulin lispro (HUMALOG) injection vial 0-6 Units, 0-6 Units, Subcutaneous, Nightly  glucose (GLUTOSE) 40 % oral gel 15 g, 15 g, Oral, PRN  dextrose 50 % IV solution, 12.5 g, Intravenous, PRN  glucagon (rDNA) injection 1 mg, 1 mg, Intramuscular, PRN  dextrose 5 % solution, 100 mL/hr, Intravenous, PRN  hydrALAZINE (APRESOLINE) injection 10 mg, 10 mg, Intravenous, Q4H PRN  enoxaparin (LOVENOX) injection 90 mg, 1 mg/kg, Subcutaneous, BID  tamsulosin (FLOMAX) capsule 0.4 mg, 0.4 mg, Oral, Daily  Allergies:  Patient has no known allergies. Social History:  TOBACCO:   reports that he quit smoking about 32 years ago. He has never used smokeless tobacco.  ETOH:   reports current alcohol use. DRUGS:   reports no history of drug use. Family History:       Problem Relation Age of Onset    Cancer Sister     Diabetes Brother        REVIEW OF SYSTEMS:  CONSTITUTIONAL:  negative  HEENT:  negative  RESPIRATORY:  negative  CARDIOVASCULAR:  negative  GASTROINTESTINAL:  negative  GENITOURINARY:  negative  MUSCULOSKELETAL:  negative  BEHAVIOR/PSYCH:  Negative    ROS neg    Family hx neg    PHYSICAL EXAM  ------------------------  Vitals:  BP (!) 150/70   Pulse 57   Temp 98.4 °F (36.9 °C) (Oral)   Resp 18   Ht 5' 7\" (1.702 m)   Wt 202 lb 6.1 oz (91.8 kg)   SpO2 96%   BMI 31.70 kg/m²      General:  Awake, alert, oriented X 2. Well developed, well nourished, well groomed. No apparent distress. HEENT:  Normocephalic, atraumatic. Pupils equal, round, reactive to light. No scleral icterus. No conjunctival injection. 1.8 09/25/2020    EOSABS 0.2 09/25/2020    BASOSABS 0.0 09/25/2020    DIFFTYPE AUTOMATED DIFFERENTIAL 09/25/2020     CMP:    Lab Results   Component Value Date     09/26/2020    K 3.9 09/26/2020     09/26/2020    CO2 22 09/26/2020    BUN 17 09/26/2020    CREATININE 0.9 09/26/2020    GFRAA >60 09/26/2020    AGRATIO 2.2 10/23/2019    LABGLOM >60 09/26/2020    GLUCOSE 146 09/26/2020    PROT 6.4 09/26/2020    LABALBU 3.9 09/26/2020    CALCIUM 9.2 09/26/2020    BILITOT 0.7 09/26/2020    ALKPHOS 67 09/26/2020    AST 15 09/26/2020    ALT 12 09/26/2020     BMP:    Lab Results   Component Value Date     09/26/2020    K 3.9 09/26/2020     09/26/2020    CO2 22 09/26/2020    BUN 17 09/26/2020    LABALBU 3.9 09/26/2020    CREATININE 0.9 09/26/2020    CALCIUM 9.2 09/26/2020    GFRAA >60 09/26/2020    LABGLOM >60 09/26/2020    GLUCOSE 146 09/26/2020     PT/INR:    Lab Results   Component Value Date    PROTIME 11.9 09/25/2020    INR 0.98 09/25/2020     PTT:  No results found for: APTT, PTT[APTT  U/A:    Lab Results   Component Value Date    COLORU RED 09/26/2020    WBCUA 77 09/26/2020    RBCUA 1,143 09/26/2020    TRICHOMONAS NONE SEEN 09/26/2020    BACTERIA NEGATIVE 09/26/2020    CLARITYU HAZY 09/26/2020    SPECGRAV 1.017 09/26/2020    LEUKOCYTESUR NEGATIVE 09/26/2020    UROBILINOGEN NORMAL 09/26/2020    BILIRUBINUR NEGATIVE 09/26/2020    BLOODU LARGE 09/26/2020     TSH:  No results found for: TSH  VITAMIN B12: No components found for: B12  FOLATE:    Lab Results   Component Value Date    FOLATE 8.9 09/26/2020     RPR:  No results found for: RPR  SANGITA:  No results found for: ANATITER, SANGITA  Urine Toxicology:  No components found for: IAMMENTA, IBARBIT, IBENZO, ICOCAINE, IMARTHC, IOPIATES, IPHENCYC     IMPRESSION:    TIA/CVA    ?  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 Discussed plan of care with pts nurse. Neno Salas MD  BOARD CERTIFIED-NEUROLOGY.

## 2020-09-26 NOTE — PROGRESS NOTES
Dr. Obed Groves at bedside with pt. Updated on pt's condition. New orders received.  Randy Sanchez RN

## 2020-09-26 NOTE — CONSULTS
[address] [facility], 8/18/1929, 3119/3119-A, 9/26/2020    History  [place]:  The primary encounter diagnosis was Altered mental status, unspecified altered mental status type. Diagnoses of Facial droop, Hypoglycemia, and Bradycardia were also pertinent to this visit. Patient  has a past medical history of Diabetes ([facility] [address] Patient  has no past surgical history on file. Subjective:  Patient states:  Pt with minimal verbalizations throughout session, per chart review pt \"doesn't talk much\" at baseline. Pain:  Denied pain. Communication with other providers:  Handoff to RN, co-eval with OT. Restrictions: General rehab precautions, fall risk, bradycardic, dementia    Home Setup/Prior level of function  Social/Functional History  Lives With: (Secondary to dementia patient unable to provide details about current living situation.)  ADL Assistance: Needs assistance(Patient not able to provide information regarding PLOF and level of daily assistance needed.)  Transfer Assistance: (HE used a walker PTA but unsure of level of A needed.)  Active : No  Additional Comments: Unable to obtain social/functional or PLOF d/t cognitive barriers. Per chart review appears pt is ambulatory with a walker at baseline, lives with family. Examination of body systems (includes body structures/functions, activity/participation limitations):  · Observation:  Supine in bed upon arrival  · Vision:  Appears WFL  · Hearing:  Appears WFL  · Cardiopulmonary:  No O2 needs, bradycardic (HR 44 to 68 bpm throughout session, improved with activity)  · Cognition: Impaired, disoriented and confused, see OT/SLP note for further evaluation. Musculoskeletal  · ROM R/L:  WFL. · Strength R/L:  Appears grossly 3+/5, unable to formally assess d/t cognitive barriers, moderate impairment in function and endurance.     · Neuro:  Appears intact with exception of poor cognition and poor balance    · Gait pattern: Unable to ambulate    Mobility:  · Supine to sit:  Max assist x2  · Transfers: Max assist x2 for sit to stand, pt unable to sequence LEs to step to chair for stand-pivot  · Sitting balance:  Consistently min to mod assist with brief periods of CGA. · Standing balance:  Max assist x2. · Gait: Unable    [facility] 6 Clicks Inpatient Mobility:  AM-PAC Inpatient Mobility Raw Score : 9    Treatment:  Pt supine in bed upon arrival to room, minimal verbal interaction throughout session. Pt asked orientation questions and responded \"I don't know\" and \"I don't know what you want from me\" throughout. For bed mobility, provided step-by-step verbal cues for LE sequencing and goal of treatment. Pt hesitant to complete but does demo initiation of LE management OOB, max assist x2 required to complete transition from supine to sit. Sitting EOB, pt demo'd significant retropulsion requiring mod assist to maintain upright posture. Engaged pt in repeated seated anterior weight shifts to improve sitting balance with good carryover noted and balance improving to CGA. Pt completed 2 sit to stand transfers with max assist x2, arm-in-arm assist. Pt with tendency for posterior lean in standing despite cues and assist to correct. Attempted side stepping at EOB however pt unable to clear LEs from ground, returned to sitting then supine for safety. Safety: patient left in bed with bed alarm on, call light within reach, RN notified, gait belt used. Assessment:  80 y.o. male admitted for stroke-like episode and decreased responsiveness at home. PMH significant for dementia and pt unable to provide information on PLOF. Per chart review, pt lives with his family and typically ambulates with a walker. At [facility], pt requires maximal assist of 2 persons to complete bed mobility and to stand and is unable to safely ambulate or transfer to a chair.  Pt demos deficits in strength, balance, and endurance that are preventing him from returning to his PLOF. Pt would benefit from further skilled PT services to address these deficits to allow him to return home safely and decrease caregiver burden upon return to home. Recommend discharge to SNF, please continue to assess discharge recommendation throughout hospital stay as suspect pt may be near his baseline but functioning poorly in unfamiliar environment due to advanced dementia. Complexity: Moderate  Prognosis: Good, no significant barriers to participation at this time.   Plan Times per week: 3+/week, 1 week,      Equipment: Suspect no needs, please continue to assess    Goals:  Short term goals  Time Frame for Short term goals: 1 week  Short term goal 1: Pt will complete bed mobility with min assist.  Short term goal 2: Pt will complete all functional transfers with min assist.  Short term goal 3: Pt will ambulate 22' with LRAD with min assist.       Treatment plan:  Bed mobility, transfers, balance, gait, TA, TX    Recommendations for NURSING mobility: Bed in chair position, seated upright for meals    Time:   Time in: 0950  Time out: 1007  Timed treatment minutes: --  Total time: 17    Electronically signed by:    Carroll Morrison PT  9/26/2020, 10:17 AM

## 2020-09-26 NOTE — PROGRESS NOTES
anticoagulation  -We will discuss with cardiology about risks versus benefits  -Given the current stroke may be better to hold off at this time  -consult cardiology on board. #HTN  -resume home meds    #CODE STATUS: Discussed with wife and daughter patient is DNR CCA    Diet DIET GENERAL; Carb Control: 4 carb choices (60 gms)/meal; Dysphagia Minced and Moist   DVT Prophylaxis [] Lovenox, []  Heparin, [] SCDs, [] Ambulation   GI Prophylaxis [] PPI,  [] H2 Blocker,  [] Carafate,  [] Diet/Tube Feeds   Code Status Full Code   Disposition Patient requires continued admission due to AMS   MDM [] Low, [] Moderate,[]  High  Patient's risk as above due to      History of Present Illness:     Chief Complaint: <principal problem not specified>  Galina Fontanez is a 80 y.o.  male  who presents with AMS       Ten point ROS reviewed negative, unless as noted above    Objective: Intake/Output Summary (Last 24 hours) at 9/26/2020 1106  Last data filed at 9/26/2020 0900  Gross per 24 hour   Intake 10 ml   Output --   Net 10 ml      Vitals:   Vitals:    09/26/20 0932   BP: (!) 189/78   Pulse:    Resp:    Temp:    SpO2:      Physical Exam:   GEN Awake male, sitting upright in bed in no apparent distress. Appears given age. EYES Pupils are equally round. No scleral erythema, discharge, or conjunctivitis. HENT Mucous membranes are moist. Oral pharynx without exudates, no evidence of thrush. NECK Supple, no apparent thyromegaly or masses. RESP Clear to auscultation, no wheezes, rales or rhonchi. Symmetric chest movement while on room air. CARDIO/VASC S1/S2 auscultated. Regular rate without appreciable murmurs, rubs, or gallops. No JVD or carotid bruits. Peripheral pulses equal bilaterally and palpable. No peripheral edema. GI Abdomen is soft without significant tenderness, masses, or guarding. Bowel sounds are normoactive. Rectal exam deferred.  No costovertebral angle tenderness.  Normal appearing external genitalia. Mcclain catheter is not present. HEME/LYMPH No palpable cervical lymphadenopathy and no hepatosplenomegaly. No petechiae or ecchymoses. MSK No gross joint deformities. SKIN Normal coloration, warm, dry. NEURO Cranial nerves appear grossly intact, normal speech, no lateralizing weakness. PSYCH Awake, alert, oriented x 1. Affect appropriate.     Medications:   Medications:    sodium chloride flush  10 mL Intravenous 2 times per day    atorvastatin  20 mg Oral Nightly    aspirin  81 mg Oral Daily    Or    aspirin  300 mg Rectal Daily    insulin lispro  0-12 Units Subcutaneous TID WC    insulin lispro  0-6 Units Subcutaneous Nightly    aspirin  324 mg Oral Once      Infusions:    dextrose       PRN Meds: sodium chloride flush, 10 mL, PRN  polyethylene glycol, 17 g, Daily PRN  promethazine, 12.5 mg, Q6H PRN    Or  ondansetron, 4 mg, Q6H PRN  labetalol, 10 mg, Q10 Min PRN  glucose, 15 g, PRN  dextrose, 12.5 g, PRN  glucagon (rDNA), 1 mg, PRN  dextrose, 100 mL/hr, PRN  hydrALAZINE, 10 mg, Q4H PRN          Electronically signed by Jericho Feliz MD on 9/26/2020 at 11:06 AM

## 2020-09-26 NOTE — PROGRESS NOTES
This RN went to ER to  Pt with Awais Voss RN. [facility] completed. Report received. Family notified of Pt room and visitation policy. Pt transported to floor, via cart. Pt oriented to room, call light in hand. Pt repeating that he is tired and just wants to go to sleep. \"It's late\" he stated.

## 2020-09-26 NOTE — CONSULTS
Chart reviewed  Full note to follow  Admitted with altered MS  Is luis felipe  Add hydrallazine for BP                      Name:  August Smith /Age/Sex: 1929  (80 y.o. male)   MRN & CSN:  [id number] & [id number] Admission Date/Time: 2020  7:19 PM   Location:  [id number]-[id number] PCP: Nhung Moon, Allen crow Gamez Day: 2          Referring physician:  Wanda Casey MD         Reason for consultation:  luis felipe        Thanks for referral.    Information source: chart/family    CC; Altered MS      HPI:   Thank you for involving me in taking  care of August Smith who  is a 80 y. o.year  Old male  Presents with  H/o DM, per    family states patient started behaving differently yesterday  afternoon. He did not eat his lunch. He was not talking much. He is not eating as much as he normally does. They described him as not being with it around 5 PM.  He was not making eye contact which was unusual for him and he was staring up at the ceiling. They noticed that his blood sugars were in the 50s. They gave him juice and food. They continued to have difficulty getting his attention. He still was not speaking. He also felt that his hands were stiff. Around 6 PM they noticed that he had some left-sided facial droop. Here he wsa luis felipe on tele, lethargic. Past medical history:    has a past medical history of Diabetes ([facility] [address] Past surgical history:   has no past surgical history on file. Social History:   reports that he quit smoking about 32 years ago. He has never used smokeless tobacco. He reports current alcohol use. He reports that he does not use drugs. Family history:  family history includes Cancer in his sister; Diabetes in his brother.     No Known Allergies    sodium chloride flush 0.9 % injection 10 mL, 2 times per day  sodium chloride flush 0.9 % injection 10 mL, PRN  polyethylene glycol (GLYCOLAX) packet 17 g, Daily PRN  promethazine (PHENERGAN) tablet 12.5 mg, Q6H PRN Or  ondansetron (ZOFRAN) injection 4 mg, Q6H PRN  atorvastatin (LIPITOR) tablet 20 mg, Nightly  aspirin EC tablet 81 mg, Daily    Or  aspirin suppository 300 mg, Daily  labetalol (NORMODYNE;TRANDATE) injection 10 mg, Q10 Min PRN  insulin lispro (HUMALOG) injection vial 0-12 Units, TID WC  insulin lispro (HUMALOG) injection vial 0-6 Units, Nightly  glucose (GLUTOSE) 40 % oral gel 15 g, PRN  dextrose 50 % IV solution, PRN  glucagon (rDNA) injection 1 mg, PRN  dextrose 5 % solution, PRN  hydrALAZINE (APRESOLINE) injection 10 mg, Q4H PRN  enoxaparin (LOVENOX) injection 90 mg, BID  tamsulosin (FLOMAX) capsule 0.4 mg, Daily  levETIRAcetam (KEPPRA) 500 mg in sodium chloride 0.9 % 100 mL IVPB, Q12H  clopidogrel (PLAVIX) tablet 75 mg, Daily      Current Facility-Administered Medications   Medication Dose Route Frequency Provider Last Rate Last Dose    sodium chloride flush 0.9 % injection 10 mL  10 mL Intravenous 2 times per day Lyndon Buchanan MD   10 mL at 09/26/20 0900    sodium chloride flush 0.9 % injection 10 mL  10 mL Intravenous PRN Lyndon Buchanan MD        polyethylene glycol (GLYCOLAX) packet 17 g  17 g Oral Daily PRN Lyndon Buchanan MD        promethazine (PHENERGAN) tablet 12.5 mg  12.5 mg Oral Q6H PRN Lyndon Buchanan MD        Or    ondansetron (ZOFRAN) injection 4 mg  4 mg Intravenous Q6H PRN Lyndon Buchanan MD        atorvastatin (LIPITOR) tablet 20 mg  20 mg Oral Nightly Lyndon Buchanan MD        aspirin EC tablet 81 mg  81 mg Oral Daily Lyndon Buchanan MD        Or   Floydene Ada aspirin suppository 300 mg  300 mg Rectal Daily Lyndon Buchanan MD   300 mg at 09/26/20 0932    labetalol (NORMODYNE;TRANDATE) injection 10 mg  10 mg Intravenous Q10 Min PRN Lyndon Buchanan MD        insulin lispro (HUMALOG) injection vial 0-12 Units  0-12 Units Subcutaneous TID  Lyndon Buchanan MD   4 Units at 09/26/20 1146    insulin lispro (HUMALOG) injection vial 0-6 Units  0-6 Units Subcutaneous Nightly Lyndon Buchanan MD   1 Units at 09/26/20 1576    glucose (GLUTOSE) 40 % oral gel 15 g  15 g Oral PRN Lluvia Lewis MD        dextrose 50 % IV solution  12.5 g Intravenous PRN Lluvia Lewis MD        glucagon (rDNA) injection 1 mg  1 mg Intramuscular PRN Lluvia Lewis MD        dextrose 5 % solution  100 mL/hr Intravenous PRN Lluvia Lewis MD        hydrALAZINE (APRESOLINE) injection 10 mg  10 mg Intravenous Q4H PRN Edilia Harris MD   10 mg at 09/26/20 0932    enoxaparin (LOVENOX) injection 90 mg  1 mg/kg Subcutaneous BID Edilia Harris MD        tamsulosin (FLOMAX) capsule 0.4 mg  0.4 mg Oral Daily Carlos Durán MD        levETIRAcetam (KEPPRA) 500 mg in sodium chloride 0.9 % 100 mL IVPB  500 mg Intravenous Q12H Neal Drake MD        clopidogrel (PLAVIX) tablet 75 mg  75 mg Oral Daily Neal Drake MD         Review of Systems:  All 14 systems reviewed, all negative except for  lethargy    Physical Examination:    BP (!) 150/70   Pulse 57   Temp 98.4 °F (36.9 °C) (Oral)   Resp 18   Ht 5' 7\" (1.702 m)   Wt 202 lb 6.1 oz (91.8 kg)   SpO2 96%   BMI 31.70 kg/m²      Wt Readings from Last 3 Encounters:   09/26/20 202 lb 6.1 oz (91.8 kg)   05/12/20 197 lb 6.4 oz (89.5 kg)   07/09/19 200 lb (90.7 kg)     Body mass index is 31.7 kg/m². General Appearance:  poor  Head: normocephalic     Eyes: normal, noninjected conjunctiva    ENT: normal mucosa, noninjected throat, normal     NECK: No JVP  No thyromegaly        Cardiovascular: No thrills palpated   Auscultation: Normal S1 and S2,  no murmur   carotid bruit no   Abdominal Aorta no bruit    Respiratory:    Breath sounds Diminshed bilaterally     Extremities:  Trace Edema clubbing ,   no cyanosis    SKIN: Warm and well perfused, no pallor or cyanosis    Vascular exam:  Pedal Pulses: palp  bilaterally        Abdomen:  No masses or tenderness. No organomegaly noted.     Neurological: lethragic    Lab Review   Recent Labs     09/26/20  0610   WBC 7.5   HGB 14.9   HCT 47.6         Recent Labs 09/26/20  0610      K 3.9      CO2 22   BUN 17   CREATININE 0.9     Recent Labs     09/26/20  0610   AST 15   ALT 12   BILIDIR 0.2   BILITOT 0.7   ALKPHOS 67     No results for input(s): TROPONINI in the last 72 hours.   No results found for: BNP  Lab Results   Component Value Date    INR 0.98 09/25/2020    PROTIME 11.9 09/25/2020           Assessment/Recommendations:   - Tommy; check TSH, dW family reg PPM , declined  - A fib high risk for anticoag se to lethargy]  - coservative treatment DW pts daughter Sara Bailey MD, 9/26/2020 6:07 PM

## 2020-09-26 NOTE — PROGRESS NOTES
Perfect serve sent to [facility]. Pt has order for oral ASA. Pt currently NPO. Pt refuses bedside swallow eval, states he too tired. New orders for rectal ASA.

## 2020-09-26 NOTE — PROGRESS NOTES
Occupational Therapy   Occupational Therapy Initial Assessment  Date: 2020   Patient Name: Uziel Coronel  MRN: [id number]     : 1929    Date of Service: 2020    Discharge Recommendations:  [address]  OT Equipment Recommendations  Equipment Needed: No    Assessment   Performance deficits / Impairments: Decreased functional mobility ; Decreased endurance;Decreased ADL status; Decreased safe awareness;Decreased balance  Prognosis: Fair  OT Education: OT Role;Orientation;Plan of Care;Transfer Training  REQUIRES OT FOLLOW UP: Yes  Activity Tolerance  Activity Tolerance: Treatment limited secondary to decreased cognition  Safety Devices  Safety Devices in place: Yes  Type of devices: All fall risk precautions in place; Left in bed;Bed alarm in place;Call light within reach  Restraints  Initially in place: No           Patient Diagnosis(es): The primary encounter diagnosis was Altered mental status, unspecified altered mental status type. Diagnoses of Facial droop, Hypoglycemia, and Bradycardia were also pertinent to this visit. has a past medical history of Diabetes ([facility] [address] has no past surgical history on file.            Restrictions  Restrictions/Precautions  Restrictions/Precautions: Fall Risk, General Precautions, Up as Tolerated    Subjective   General  Chart Reviewed: Yes, Orders, Progress Notes, History and Physical  Patient Currently in Pain: Denies  Vital Signs  Temp: 97.8 °F (36.6 °C)  Temp Source: Oral  Pulse: 66  Heart Rate Source: Monitor  Resp: 21  BP: (!) 189/78  BP Location: Right upper arm  BP Upper/Lower: Upper  MAP (mmHg): 135  Patient Position: Semi fowlers  Level of Consciousness: Alert  MEWS Score: 1  Patient Currently in Pain: Denies  Oxygen Therapy  SpO2: 98 %  Pulse Oximeter Device Mode: Continuous  Pulse Oximeter Device Location: Right;Finger  O2 Device: None (Room air)  Social/Functional History  Social/Functional History  Lives With: (Secondary to not attempt transfers due to poor standing endurance and max difficulty. Not safe at this time.)  Sit to stand: Maximum assistance  Stand to sit: Maximum assistance     Cognition  Overall Cognitive Status: Exceptions  Arousal/Alertness: Inconsistent responses to stimuli  Following Commands: Inconsistently follows commands  Attention Span: Difficulty attending to directions  Memory: Decreased short term memory;Decreased recall of biographical Information;Decreased long term memory  Insights: Not aware of deficits  Initiation: Requires cues for all  Perception  Overall Perceptual Status: Impaired  Initiation: Cues to initiate tasks  Motor Planning: (Poor motor planning)                                     Plan   Plan  Times per week: 2x/week  Current Treatment Recommendations: Functional Mobility Training, Cognitive Reorientation, Patient/Caregiver Education & Training, ROM, Balance Training, Safety Education & Training, Self-Care / ADL    G-Code     OutComes Score                                                  AM-PAC Score             Goals  Short term goals  Time Frame for Short term goals: 1 week or discharge  Short term goal 1: Pt will sit EOB with SBA while participating in ADL tasks for at least 5 minutes  Short term goal 2: With setup provided patient will perform grooming tasks with min cues for orientation of tasks and proper use of common items. Short term goal 3: Pt will follow UE exercises with therapsit modeling with min cues to allow for maintenance of BUE ROM and increased endurance. Patient Goals   Patient goals : Pt unable to state goals. Decreased awareness of situation.        Therapy Time   Individual Concurrent Group Co-treatment   Time In 1000         Time Out 1030         Minutes 30                 Lesia Smoke, OTR/L

## 2020-09-26 NOTE — PROGRESS NOTES
New orders received from Dr. Elridge Mcardle. Orders to maintain a bp between 150-180.  Lashay Nunes RN

## 2020-09-27 LAB
ALBUMIN SERPL-MCNC: 4 GM/DL (ref 3.4–5)
ALP BLD-CCNC: 70 IU/L (ref 40–129)
ALT SERPL-CCNC: 10 U/L (ref 10–40)
ANION GAP SERPL CALCULATED.3IONS-SCNC: 12 MMOL/L (ref 4–16)
AST SERPL-CCNC: 14 IU/L (ref 15–37)
BILIRUB SERPL-MCNC: 0.8 MG/DL (ref 0–1)
BUN BLDV-MCNC: 14 MG/DL (ref 6–23)
CALCIUM SERPL-MCNC: 9.5 MG/DL (ref 8.3–10.6)
CHLORIDE BLD-SCNC: 104 MMOL/L (ref 99–110)
CO2: 24 MMOL/L (ref 21–32)
CREAT SERPL-MCNC: 1 MG/DL (ref 0.9–1.3)
GFR AFRICAN AMERICAN: >60 ML/MIN/1.73M2
GFR NON-AFRICAN AMERICAN: >60 ML/MIN/1.73M2
GLUCOSE BLD-MCNC: 131 MG/DL (ref 70–99)
GLUCOSE BLD-MCNC: 136 MG/DL (ref 70–99)
GLUCOSE BLD-MCNC: 146 MG/DL (ref 70–99)
GLUCOSE BLD-MCNC: 147 MG/DL (ref 70–99)
GLUCOSE BLD-MCNC: 150 MG/DL (ref 70–99)
HOMOCYSTEINE: 12.2 UMOL/L (ref 0–10)
POTASSIUM SERPL-SCNC: 3.8 MMOL/L (ref 3.5–5.1)
SODIUM BLD-SCNC: 140 MMOL/L (ref 135–145)
TOTAL PROTEIN: 6.3 GM/DL (ref 6.4–8.2)

## 2020-09-27 PROCEDURE — APPSS30 APP SPLIT SHARED TIME 16-30 MINUTES: Performed by: NURSE PRACTITIONER

## 2020-09-27 PROCEDURE — 80053 COMPREHEN METABOLIC PANEL: CPT

## 2020-09-27 PROCEDURE — 6370000000 HC RX 637 (ALT 250 FOR IP): Performed by: INTERNAL MEDICINE

## 2020-09-27 PROCEDURE — 6360000002 HC RX W HCPCS: Performed by: PSYCHIATRY & NEUROLOGY

## 2020-09-27 PROCEDURE — 6360000002 HC RX W HCPCS: Performed by: INTERNAL MEDICINE

## 2020-09-27 PROCEDURE — 6370000000 HC RX 637 (ALT 250 FOR IP): Performed by: HOSPITALIST

## 2020-09-27 PROCEDURE — 99232 SBSQ HOSP IP/OBS MODERATE 35: CPT | Performed by: INTERNAL MEDICINE

## 2020-09-27 PROCEDURE — 94761 N-INVAS EAR/PLS OXIMETRY MLT: CPT

## 2020-09-27 PROCEDURE — 83090 ASSAY OF HOMOCYSTEINE: CPT

## 2020-09-27 PROCEDURE — 2580000003 HC RX 258: Performed by: PSYCHIATRY & NEUROLOGY

## 2020-09-27 PROCEDURE — 2580000003 HC RX 258: Performed by: HOSPITALIST

## 2020-09-27 PROCEDURE — 82962 GLUCOSE BLOOD TEST: CPT

## 2020-09-27 PROCEDURE — 6370000000 HC RX 637 (ALT 250 FOR IP): Performed by: PSYCHIATRY & NEUROLOGY

## 2020-09-27 PROCEDURE — 36415 COLL VENOUS BLD VENIPUNCTURE: CPT

## 2020-09-27 PROCEDURE — 2140000000 HC CCU INTERMEDIATE R&B

## 2020-09-27 RX ORDER — ATROPINE SULFATE 0.1 MG/ML
0.5 INJECTION INTRAVENOUS ONCE
Status: DISCONTINUED | OUTPATIENT
Start: 2020-09-27 | End: 2020-09-30 | Stop reason: HOSPADM

## 2020-09-27 RX ADMIN — LEVETIRACETAM 500 MG: 100 INJECTION, SOLUTION INTRAVENOUS at 17:43

## 2020-09-27 RX ADMIN — SODIUM CHLORIDE, PRESERVATIVE FREE 10 ML: 5 INJECTION INTRAVENOUS at 22:05

## 2020-09-27 RX ADMIN — LEVETIRACETAM 500 MG: 100 INJECTION, SOLUTION INTRAVENOUS at 06:19

## 2020-09-27 RX ADMIN — ENOXAPARIN SODIUM 90 MG: 100 INJECTION SUBCUTANEOUS at 11:54

## 2020-09-27 RX ADMIN — SODIUM CHLORIDE, PRESERVATIVE FREE 10 ML: 5 INJECTION INTRAVENOUS at 11:54

## 2020-09-27 ASSESSMENT — PAIN SCALES - GENERAL
PAINLEVEL_OUTOF10: 0
PAINLEVEL_OUTOF10: 0

## 2020-09-27 NOTE — PLAN OF CARE
Problem: Falls - Risk of:  Goal: Will remain free from falls  Description: Will remain free from falls  Outcome: Met This Shift  Goal: Absence of physical injury  Description: Absence of physical injury  Outcome: Met This Shift     Problem: Skin Integrity:  Goal: Will show no infection signs and symptoms  Description: Will show no infection signs and symptoms  Outcome: Ongoing  Goal: Absence of new skin breakdown  Description: Absence of new skin breakdown  Outcome: Ongoing     Problem: HEMODYNAMIC STATUS  Goal: Patient has stable vital signs and fluid balance  Outcome: Ongoing     Problem: ACTIVITY INTOLERANCE/IMPAIRED MOBILITY  Goal: Mobility/activity is maintained at optimum level for patient  Outcome: Ongoing     Problem: COMMUNICATION IMPAIRMENT  Goal: Ability to express needs and understand communication  Outcome: Ongoing

## 2020-09-27 NOTE — CONSULTS
Department of Urology   Consult Note  [facility] 1 2 3 4 5      Date: 2020   Patient: Mariusz Hillman   : 1929   DOA: 2020   MRN: [id number]   ROOM#: [id number]     Reason for Consult:  Acute Urinary Retention and Acute gross hematuria [facility] and goodson catheter trauma  Requesting Physician:  hospitalist    CHIEF COMPLAINT:  Altered mental status and CVA  (poor historian)    History Obtained From:  Medical records    HISTORY OF PRESENT ILLNESS:                The patient is a 80 y.o. male with significant past medical history of DM and recent CVA. Urinary retention with PVR 750ml. Gross hematuria occurred after CIC x 2 by nursing staff and now goodson catheter placed. Patient unable to give urological history. He would not cooperate for MARU. Past Medical History:        Diagnosis Date    Diabetes [facility])      Past Surgical History:    History reviewed. No pertinent surgical history.   Current Medications:   Current Facility-Administered Medications: sodium chloride flush 0.9 % injection 10 mL, 10 mL, Intravenous, 2 times per day  sodium chloride flush 0.9 % injection 10 mL, 10 mL, Intravenous, PRN  polyethylene glycol (GLYCOLAX) packet 17 g, 17 g, Oral, Daily PRN  promethazine (PHENERGAN) tablet 12.5 mg, 12.5 mg, Oral, Q6H PRN **OR** ondansetron (ZOFRAN) injection 4 mg, 4 mg, Intravenous, Q6H PRN  atorvastatin (LIPITOR) tablet 20 mg, 20 mg, Oral, Nightly  aspirin EC tablet 81 mg, 81 mg, Oral, Daily **OR** aspirin suppository 300 mg, 300 mg, Rectal, Daily  labetalol (NORMODYNE;TRANDATE) injection 10 mg, 10 mg, Intravenous, Q10 Min PRN  insulin lispro (HUMALOG) injection vial 0-12 Units, 0-12 Units, Subcutaneous, TID WC  insulin lispro (HUMALOG) injection vial 0-6 Units, 0-6 Units, Subcutaneous, Nightly  glucose (GLUTOSE) 40 % oral gel 15 g, 15 g, Oral, PRN  dextrose 50 % IV solution, 12.5 g, Intravenous, PRN  glucagon (rDNA) injection 1 mg, 1 mg, Intramuscular, PRN  dextrose 5 % solution, 100 mL/hr, Intravenous, PRN  enoxaparin (LOVENOX) injection 90 mg, 1 mg/kg, Subcutaneous, BID  tamsulosin (FLOMAX) capsule 0.4 mg, 0.4 mg, Oral, Daily  levETIRAcetam (KEPPRA) 500 mg in sodium chloride 0.9 % 100 mL IVPB, 500 mg, Intravenous, Q12H  clopidogrel (PLAVIX) tablet 75 mg, 75 mg, Oral, Daily    Allergies:  Patient has no known allergies. Social History: Former smoker  Care at [address] [place] [address] History:         Problem Relation Age of Onset    Cancer Sister     Diabetes Brother        REVIEW OF SYSTEMS:        PHYSICAL EXAM:    VITALS:  BP (!) 158/69   Pulse 62   Temp 98.7 °F (37.1 °C) (Oral)   Resp 16   Ht 5' 7\" (1.702 m)   Wt 202 lb 6.1 oz (91.8 kg)   SpO2 96%   BMI 31.70 kg/m²     TEMPERATURE:  Current - Temp: 98.7 °F (37.1 °C); Max - Temp  Av.7 °F (37.1 °C)  Min: 98.4 °F (36.9 °C)  Max: 99.2 °F (37.3 °C)  24HR BLOOD PRESSURE RANGE:  Systolic (90OFL), YJH:111 , Min:138 , THO:431   ; Diastolic (18AGG), YMV:24, Min:58, Max:86    8HR INTAKE OUTPUT:  No intake/output data recorded. URINARY CATHETER OUTPUT ([place]):     DRAIN/TUBE OUTPUT:        CONSTITUTIONAL:  awake,  Unable to give history,  Baseline MS? Will not cooperate for MARU  Mcclain in place. Slight ooze at meatus. Urine is pink in tubing    Data:    WBC:    Lab Results   Component Value Date    WBC 7.5 2020     Hemoglobin/Hematocrit:    Lab Results   Component Value Date    HGB 14.9 2020    HCT 47.6 2020     BMP:    Lab Results   Component Value Date     2020    K 3.9 2020     2020    CO2 22 2020    BUN 17 2020    LABALBU 3.9 2020    CREATININE 0.9 2020    CALCIUM 9.2 2020    GFRAA >60 2020    LABGLOM >60 2020     PT/INR:    Lab Results   Component Value Date    PROTIME 11.9 2020    INR 0.98 2020           Imaging: [unfilled]          Impression: 1.   Acute gross hematuria secondary to [facility] [place] and catheter trauma- light pink

## 2020-09-27 NOTE — PROGRESS NOTES
microvascular ischemic change. Chronic lacunar infarcts bilateral basal ganglia redemonstrated. Tiny focus of acute ischemia involving left cerebral peduncle on brain MRI earlier today is not evident on the CT exam. ORBITS: The visualized portion of the orbits demonstrate no acute abnormality. SINUSES: The visualized paranasal sinuses and mastoid air cells demonstrate no acute abnormality. SOFT TISSUES/SKULL: No acute abnormality of the visualized skull or soft tissues. Stable exam without CT evidence for acute intracranial abnormality. Tiny focus of acute ischemia involving left cerebral peduncle on brain MRI earlier today is not evident on this CT exam.  Reference can be made to the MRI for additional information. Ct Head Wo Contrast    Result Date: 9/25/2020  EXAMINATION: CT OF THE HEAD WITHOUT CONTRAST  9/25/2020 7:21 pm TECHNIQUE: CT of the head was performed without the administration of intravenous contrast. Dose modulation, iterative reconstruction, and/or weight based adjustment of the mA/kV was utilized to reduce the radiation dose to as low as reasonably achievable. COMPARISON: None. HISTORY: ORDERING SYSTEM PROVIDED HISTORY: CVA TECHNOLOGIST PROVIDED HISTORY: Has a \"code stroke\" or \"stroke alert\" been called?-> yes Reason for exam:-> CVA Reason for Exam: CVA Acuity: Acute Type of Exam: Initial FINDINGS: BRAIN/VENTRICLES: There is no acute intracranial hemorrhage, mass effect or midline shift. No abnormal extra-axial fluid collection. The gray-white differentiation is maintained without evidence of an acute infarct. There is prominence of the ventricles and sulci due to global parenchymal volume loss. There are nonspecific areas of hypoattenuation within the periventricular and subcortical white matter, which likely represent chronic microvascular ischemic change. Multifocal probably remote lacunar stroke involving the bilateral basal ganglia and left centrum semiovale.  ORBITS: The visualized portion of the orbits demonstrate no acute abnormality. SINUSES: The visualized paranasal sinuses and mastoid air cells demonstrate no acute abnormality. SOFT TISSUES/SKULL: No acute abnormality of the visualized skull or soft tissues. No acute intracranial abnormality. Multifocal probably remote lacunar stroke involving the bilateral basal ganglia and left centrum semiovale. Senescent changes. Per stroke alert protocol, results were called by Dr. Hay Goldman to   [address] on 9/25/2020 at 19:31. Xr Chest Portable    Result Date: 9/25/2020  EXAMINATION: ONE XRAY VIEW OF THE CHEST 9/25/2020 7:52 pm COMPARISON: None. HISTORY: ORDERING SYSTEM PROVIDED HISTORY: cva TECHNOLOGIST PROVIDED HISTORY: Reason for exam:->cva Reason for Exam: CVA Acuity: Acute Type of Exam: Initial Additional signs and symptoms: unknown Relevant Medical/Surgical History: none FINDINGS: Cardiac silhouette is enlarged. Atherosclerotic changes of the aorta. Chronic interstitial changes of the lungs. No focal consolidation, pleural effusion, or pneumothorax identified. Osseous structures appear intact. 1. Cardiomegaly. 2. No focal consolidation identified. Cta Neck W Contrast    Result Date: 9/25/2020  EXAMINATION: CTA OF THE HEAD WITH CONTRAST; CTA OF THE NECK 9/25/2020 7:25 pm: TECHNIQUE: CTA of the head/brain was performed with the administration of intravenous contrast. Multiplanar reformatted images are provided for review. MIP images are provided for review. Dose modulation, iterative reconstruction, and/or weight based adjustment of the mA/kV was utilized to reduce the radiation dose to as low as reasonably achievable.; CTA of the neck was performed with the administration of intravenous contrast. Multiplanar reformatted images are provided for review. MIP images are provided for review. Stenosis of the internal carotid arteries measured using NASCET criteria.  Dose modulation, iterative reconstruction, and/or weight based adjustment of the mA/kV was utilized to reduce the radiation dose to as low as reasonably achievable. COMPARISON: None. HISTORY: ORDERING SYSTEM PROVIDED HISTORY: cva TECHNOLOGIST PROVIDED HISTORY: Has a \"code stroke\" or \"stroke alert\" been called? ->Yes Reason for exam:->cva Reason for Exam: cva Acuity: Acute Type of Exam: Initial FINDINGS: CTA NECK: AORTIC ARCH/ARCH VESSELS: Mild atherosclerotic plaque at the aortic arch without aneurysm or dissection. Conventional 3 vessel arch anatomy. Mild atherosclerotic plaque involving the innominate and subclavian arteries without stenosis. CAROTID ARTERIES: The common carotid arteries are normal in caliber. Atherosclerotic plaque at the carotid bifurcations and bulbs causes 40% stenosis of the proximal internal carotid arteries. The external carotid arteries are patent. No dissection. VERTEBRAL ARTERIES: The left vertebral artery is normal in caliber. The right vertebral artery is hypoplastic with diminishing contrast enhancement along its length through the V2/V3 junction with no contrast seen in the V3 segment. SOFT TISSUES: The lung apices are clear. No cervical or superior mediastinal lymphadenopathy. The larynx and pharynx are unremarkable. No acute abnormality of the salivary and thyroid glands. BONES: There is no acute fracture or suspect osseous lesion. Mild C5-6 and C6-7 degenerative disc disease is noted. CTA HEAD: ANTERIOR CIRCULATION: Calcification of the cavernous internal carotid arteries without significant stenosis. Anterior cerebral arteries are normal in caliber with hypoplasia of the right A1 segment. There is severe stenosis of bilateral right middle cerebral artery anterior M2 segments. POSTERIOR CIRCULATION: No contrast in the right vertebral artery. Left vertebral and basilar arteries are normal in caliber.   There is severe focal stenosis of the P2 segment left posterior cerebral artery and moderate focal stenosis of the P2 segment right posterior cerebral artery. OTHER: No dural venous sinus thrombosis on this non-dedicated study. BRAIN: No mass effect or midline shift. No extra-axial fluid collection. The gray-white differentiation is maintained. 1. No large vessel occlusion of the intracranial arteries. 2. 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 3. 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 4. 40% stenosis of the proximal internal carotid arteries. 5. No significant left vertebral artery stenosis. Cta Head W Contrast    Result Date: 9/25/2020  EXAMINATION: CTA OF THE HEAD WITH CONTRAST; CTA OF THE NECK 9/25/2020 7:25 pm: TECHNIQUE: CTA of the head/brain was performed with the administration of intravenous contrast. Multiplanar reformatted images are provided for review. MIP images are provided for review. Dose modulation, iterative reconstruction, and/or weight based adjustment of the mA/kV was utilized to reduce the radiation dose to as low as reasonably achievable.; CTA of the neck was performed with the administration of intravenous contrast. Multiplanar reformatted images are provided for review. MIP images are provided for review. Stenosis of the internal carotid arteries measured using NASCET criteria. Dose modulation, iterative reconstruction, and/or weight based adjustment of the mA/kV was utilized to reduce the radiation dose to as low as reasonably achievable. COMPARISON: None. HISTORY: ORDERING SYSTEM PROVIDED HISTORY: cva TECHNOLOGIST PROVIDED HISTORY: Has a \"code stroke\" or \"stroke alert\" been called? ->Yes Reason for exam:->cva Reason for Exam: cva Acuity: Acute Type of Exam: Initial FINDINGS: CTA NECK: AORTIC ARCH/ARCH VESSELS: Mild atherosclerotic plaque at the aortic arch without aneurysm or dissection. Conventional 3 vessel arch anatomy.   Mild atherosclerotic plaque involving the innominate and subclavian arteries without stenosis. CAROTID ARTERIES: The common carotid arteries are normal in caliber. Atherosclerotic plaque at the carotid bifurcations and bulbs causes 40% stenosis of the proximal internal carotid arteries. The external carotid arteries are patent. No dissection. VERTEBRAL ARTERIES: The left vertebral artery is normal in caliber. The right vertebral artery is hypoplastic with diminishing contrast enhancement along its length through the V2/V3 junction with no contrast seen in the V3 segment. SOFT TISSUES: The lung apices are clear. No cervical or superior mediastinal lymphadenopathy. The larynx and pharynx are unremarkable. No acute abnormality of the salivary and thyroid glands. BONES: There is no acute fracture or suspect osseous lesion. Mild C5-6 and C6-7 degenerative disc disease is noted. CTA HEAD: ANTERIOR CIRCULATION: Calcification of the cavernous internal carotid arteries without significant stenosis. Anterior cerebral arteries are normal in caliber with hypoplasia of the right A1 segment. There is severe stenosis of bilateral right middle cerebral artery anterior M2 segments. POSTERIOR CIRCULATION: No contrast in the right vertebral artery. Left vertebral and basilar arteries are normal in caliber. There is severe focal stenosis of the P2 segment left posterior cerebral artery and moderate focal stenosis of the P2 segment right posterior cerebral artery. OTHER: No dural venous sinus thrombosis on this non-dedicated study. BRAIN: No mass effect or midline shift. No extra-axial fluid collection. The gray-white differentiation is maintained. 1. No large vessel occlusion of the intracranial arteries. 2. 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 3. 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 4. 40% stenosis of the proximal internal carotid arteries.  5. No significant left vertebral artery stenosis. Mri Brain Without Contrast    Result Date: 9/26/2020  EXAMINATION: MRI OF THE BRAIN WITHOUT CONTRAST  9/26/2020 3:08 pm TECHNIQUE: Multiplanar multisequence MRI of the brain was performed without the administration of intravenous contrast. COMPARISON: None. HISTORY: ORDERING SYSTEM PROVIDED HISTORY: facial drooping TECHNOLOGIST PROVIDED HISTORY: Reason for exam:->facial drooping Reason for Exam: ams facial droop-pt unable to follow directions cough - repeated many scans-best images FINDINGS: Motion limited evaluation despite repeat imaging attempts. INTRACRANIAL STRUCTURES/VENTRICLES: Diffuse parenchymal volume loss with moderate chronic white matter microvascular ischemic changes and chronic lacunar infarcts in the bilateral basal ganglia and cerebellum. Punctate focus of diffusion restriction and T2/FLAIR hyperintensity in the left cerebral peduncle and left anterior midbrain. No intracranial mass. No mass effect or midline shift. No evidence of an acute intracranial hemorrhage. The ventricles and sulci are normal in size and configuration. The sellar/suprasellar regions appear unremarkable. The normal signal voids within the major intracranial vessels appear maintained. ORBITS: The visualized portion of the orbits demonstrate no acute abnormality. SINUSES: The visualized paranasal sinuses and mastoid air cells are well aerated. BONES/SOFT TISSUES: The bone marrow signal intensity appears normal. The soft tissues demonstrate no acute abnormality. 1. Motion limited evaluation. 2. Tiny acute ischemic infarct in the left cerebral peduncle and anterior mid brain. 3. No hemorrhage or mass effect. 4. Diffuse parenchymal volume loss with moderate chronic white matter microvascular ischemic changes and chronic lacunar infarcts in the bilateral basal ganglia.        LAB RESULTS  --------------------    Recent Results (from the past 24 hour(s))   POCT Glucose    Collection Time: 09/26/20 10:14 PM   Result Value Ref Range    POC Glucose 150 (H) 70 - 99 MG/DL   Homocysteine, Serum    Collection Time: 09/27/20  4:06 AM   Result Value Ref Range    Homocysteine 12.2 (H) 0 - 10 umol/L   Comprehensive Metabolic Panel    Collection Time: 09/27/20  4:06 AM   Result Value Ref Range    Sodium 140 135 - 145 MMOL/L    Potassium 3.8 3.5 - 5.1 MMOL/L    Chloride 104 99 - 110 mMol/L    CO2 24 21 - 32 MMOL/L    BUN 14 6 - 23 MG/DL    CREATININE 1.0 0.9 - 1.3 MG/DL    Glucose 146 (H) 70 - 99 MG/DL    Calcium 9.5 8.3 - 10.6 MG/DL    Alb 4.0 3.4 - 5.0 GM/DL    Total Protein 6.3 (L) 6.4 - 8.2 GM/DL    Total Bilirubin 0.8 0.0 - 1.0 MG/DL    ALT 10 10 - 40 U/L    AST 14 (L) 15 - 37 IU/L    Alkaline Phosphatase 70 40 - 129 IU/L    GFR Non-African American >60 >60 mL/min/1.73m2    GFR African American >60 >60 mL/min/1.73m2    Anion Gap 12 4 - 16   POCT Glucose    Collection Time: 09/27/20  8:29 AM   Result Value Ref Range    POC Glucose 147 (H) 70 - 99 MG/DL   POCT Glucose    Collection Time: 09/27/20 12:24 PM   Result Value Ref Range    POC Glucose 136 (H) 70 - 99 MG/DL   POCT Glucose    Collection Time: 09/27/20  5:31 PM   Result Value Ref Range    POC Glucose 150 (H) 70 - 99 MG/DL         Medical problems    Patient Active Problem List:     Persistent atrial fibrillation     Abnormal EKG     Dementia without behavioral disturbance (HCC)     Loose stools     Stroke-like episode     Altered mental status      ASSESSMENT:  ---------------------    TIA/CVA     ? 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     Discussed plan of care with pts nurse. Pt is doing some better today and is communicating wih his family.         Electronically signed by Aiden Bess MD on 9/27/2020 at 6:10 PM

## 2020-09-27 NOTE — PROGRESS NOTES
Patients wife and daughter are at bedside. Patient has had a decrease in orientation. Patient was only able to state name and squeeze hands. Now patient is not opening eyes. Pupils are pin point. HR is staying around 50's dipping into the 30's at times. Respirations are starting to be irregular. Family confirms code status of DNR-CCA no intubation no compressions. Dr. Veryl Cowden was notiifed and came to patients bedside. See new orders.

## 2020-09-27 NOTE — PROGRESS NOTES
Cardiology Progress Note     Today's Plan holter     Admit Date:  9/25/2020    Consult reason/ Seen today for: bradycardia    Subjective and  Overnight Events:  He is confused this am - denies any dizziness or lightheadedness    Telemetry Atrial fib  Had pause of 3.29 seconds in the setting of atrial fib -will follow     Assessment / Plan / Recommendation:     1. Bradycardia- episodes of bradycardia with rate in the 30's - TSH normal - hold rate lowering medications - per notes daughter would like conservative management - holter is pending   2. Atrial fib-he is a high risk for anticoagulation secondary to lethargy/ falls - no CCB or BB as he is having episode of significant bradycardia with transient episodes of HR in the 20-30's   3. TIA/ vs CVA- neurology following - on ASA and statin         History of Presenting Illness:    Chief complain on admission : 80 y. o.year old who is admitted for  Chief Complaint   Patient presents with    Altered Mental Status        Past medical history:    has a past medical history of Diabetes ([facility] [address] Past surgical history:   has no past surgical history on file. Social History:   reports that he quit smoking about 32 years ago. He has never used smokeless tobacco. He reports current alcohol use. He reports that he does not use drugs. Family history:  family history includes Cancer in his sister; Diabetes in his brother.     No Known Allergies    Review of Systems:   All 14 systems were reviewed and are negative  Except for the positive findings which are documented     BP (!) 158/69   Pulse 62   Temp 98.7 °F (37.1 °C) (Oral)   Resp 16   Ht 5' 7\" (1.702 m)   Wt 202 lb 6.1 oz (91.8 kg)   SpO2 96%   BMI 31.70 kg/m²       Intake/Output Summary (Last 24 hours) at 9/27/2020 1037  Last data filed at 9/27/2020 0442  Gross per 24 hour   Intake 101 ml   Output 2200 ml   Net -2099 ml       Physical Problems:    * No resolved hospital problems. *       All labs, medications and tests reviewed by myself, continue all other medications of all above medical condition listed as is except for changes mentioned above. Thank you   Please call with questions. Electronically signed by IVON Hurt CNP on 9/27/2020 at 10:37 AM  I have seen ,spoken to  and examined this patient personally, independently of the nurse practitioner. I have reviewed the hospital care given to date and reviewed all pertinent labs and imaging. The plan was developed mutually at the time of the visit with the patient,  NP  and myself. I have spoken with patient, nursing staff and provided written and verbal instructions . The above note has been reviewed and I agree with the assessment, diagnosis, and treatment plan with changes made by me as follows     CARDIOLOGY ATTENDING ADDENDUM    HPI:  I have reviewed the above HPI  And agree with above   Aissatou Peguero is a 80 y. o.year old who and presents with had concerns including Altered Mental Status. Chief Complaint   Patient presents with    Altered Mental Status     Interval history:  No change    Physical Exam:  General:  Awake, alert, NAD  Head:normal  Eye:normal  Neck:  No JVD   Chest:  Clear to auscultation, respiration easy  Cardiovascular:  RRR S1S2  Abdomen:   nontender  Extremities:  no edema  Pulses; palpable  Neuro: grossly normal      MEDICAL DECISION MAKING;    I agree with the above plan, which was planned by myself and discussed with NP.   Slow HR  No PPM per family        Fior Ahumada MD [facility]

## 2020-09-27 NOTE — PROGRESS NOTES
Patients family stated that they will be meeting with the palliative care team tomorrow 9/28/2020. Patient is allowed to have 3 visitors at a time at this point when patient declines even more, more visitors will be assessed at that time. Patients family is thinking about taking patient home to pass. MD is aware. Pastoral care did come see patient and the family. Family does voice that they are aware patients heart can \" give out \" at any time. All questions answered for family at this time.

## 2020-09-27 NOTE — PROGRESS NOTES
Hospitalist Progress Note      Name:  Zora Nicole /Age/Sex: 1929  (80 y.o. male)   MRN & CSN:  [id number] & [id number] Admission Date/Time: 2020  7:19 PM   Location:  [address] [place] [address] PCP: Juan Antonio Murillo MD         Hospital Day: 3    Assessment and Plan:   Zora Nicole is a 80 y.o.  male  who presents with AMS.    #Left Facial Droop: CVA in left cerebral peduncle and anterior mid brain  #Seizure? ?  -Pt has hx of multiple CVA  -he has a hx of afib not on anticoagulation. Cardiology discussed with family who do not want anticoagulation at this time  -CTA head and neck: Right vertebral artery with occlusion; bilateral posterior cerebral artery stenosis; 40% stenosis of proximal ICA. -CT head: Multifocal probably remote lacunar strokes  -continue aspirin, plavix and statin  -continue keppra  -[address] [place] [address]  -MRI: Tiny acute ischemic infarct in the left cerebral peduncle and anterior mid brain. diffuse parenchymal loss,moderate chronic white matter microvascular changes. Chronic lacunar infarcts in bilateral basal ganglia  - neurology on board  -PT/OT consulted     #Acute Metabolic Encephalopathy: Hypoglycemia vs CVA versus vs seizure  -Patient is alert but oriented to self only  -concerns for seizure   -patient started on keppera  -B12 and folate within normal limits  -patient was hypoglycemic on admission and had issues with swallowing   -TSH within normal limits  -Neurology on board logy on board    #[id number]  -patient was hypoglycemic on admission and had issues with swallowing   -specch therapy recommend     #Urinary retention  #hematuria: traumatic  -At bedside patient was found to have about 900 cc of urine   -continue Flomax   -Mcclain catheter in place  -UA and cultures sent  -Urology consulted     #Hypoglycemia, in the setting of diabetes mellitus  #Possible history of dementia with dysphagia  -patient is having poor oral intake per family   -Insulin sliding scale  -Hold p.o. metformin  -Continue hypoglycemia protocol.  -Speech therapy consulted:Recommend giving pills in pureed or pudding consistency (whole okay). SLP will follow     #History of A. Fib: Not on anticoagulation  #Bradycardia  #SSS  -family decline PM  -Patient has heart rate in the 40s  -Troponins x2  -EKG: A. fib with slow ventricular response, RBBB and old infarct  -Patient [address]. He is currently not on anticoagulation  -family decline anticaogulation  -cardiology on board.       #HTN  -resume home meds     #CODE STATUS: Discussed with wife and daughter patient is DNR CCA    Addendum 1.21PM: Was called to bedside and patient's heart rate was in the low 40s, rest was about 20s blood pressure was stable. He was difficult to arouse. Family adamant that they want patient DNR CCA. Given patient's age and severity of his chronic conditions, family involvement life expectancy could be anywhere from days to weeks to months. Labs were drawn, atropine ordered. Pastoral care per family request and pain and palliative consulted. Diet DIET GENERAL; Carb Control: 4 carb choices (60 gms)/meal; Dysphagia Minced and Moist   DVT Prophylaxis [] Lovenox, []  Heparin, [] SCDs, [] Ambulation   GI Prophylaxis [] PPI,  [] H2 Blocker,  [] Carafate,  [] Diet/Tube Feeds   Code Status DNR-CCA   Disposition Patient requires continued admission due to AMS   MDM [] Low, [] Moderate,[]  High  Patient's risk as above due to      History of Present Illness:     Chief Complaint: <principal problem not specified>  Ambrosio Pitcher is a 80 y.o.  male  who presents with AMS       Ten point ROS reviewed negative, unless as noted above    Objective:        Intake/Output Summary (Last 24 hours) at 9/27/2020 1034  Last data filed at 9/27/2020 0442  Gross per 24 hour   Intake 101 ml   Output 2200 ml   Net -2099 ml      Vitals:   Vitals:    09/27/20 0330   BP: (!) 158/69   Pulse: 62   Resp: 16   Temp: 98.7 °F (37.1 °C)   SpO2: 96%     Physical Exam: GEN Awake male, sitting upright in bed in no apparent distress. Appears given age. EYES Pupils are equally round. No scleral erythema, discharge, or conjunctivitis. HENT Mucous membranes are moist. Oral pharynx without exudates, no evidence of thrush. NECK Supple, no apparent thyromegaly or masses. RESP Clear to auscultation, no wheezes, rales or rhonchi. Symmetric chest movement while on room air. CARDIO/VASC S1/S2 auscultated. Regular rate without appreciable murmurs, rubs, or gallops. No JVD or carotid bruits. Peripheral pulses equal bilaterally and palpable. No peripheral edema. GI Abdomen is soft without significant tenderness, masses, or guarding. Bowel sounds are normoactive. Rectal exam deferred.  No costovertebral angle tenderness. Normal appearing external genitalia. Mcclain catheter is not present. HEME/LYMPH No palpable cervical lymphadenopathy and no hepatosplenomegaly. No petechiae or ecchymoses. MSK No gross joint deformities. SKIN Normal coloration, warm, dry. NEURO Cranial nerves appear grossly intact, normal speech, no lateralizing weakness. PSYCH Awake, alert, oriented self. Affect appropriate.     Medications:   Medications:    sodium chloride flush  10 mL Intravenous 2 times per day    atorvastatin  20 mg Oral Nightly    aspirin  81 mg Oral Daily    Or    aspirin  300 mg Rectal Daily    insulin lispro  0-12 Units Subcutaneous TID WC    insulin lispro  0-6 Units Subcutaneous Nightly    enoxaparin  1 mg/kg Subcutaneous BID    tamsulosin  0.4 mg Oral Daily    levetiracetam  500 mg Intravenous Q12H    clopidogrel  75 mg Oral Daily      Infusions:    dextrose       PRN Meds: sodium chloride flush, 10 mL, PRN  polyethylene glycol, 17 g, Daily PRN  promethazine, 12.5 mg, Q6H PRN    Or  ondansetron, 4 mg, Q6H PRN  labetalol, 10 mg, Q10 Min PRN  glucose, 15 g, PRN  dextrose, 12.5 g, PRN  glucagon (rDNA), 1 mg, PRN  dextrose, 100 mL/hr, PRN          Electronically signed by Emi Keller MD on 9/27/2020 at 10:34 AM

## 2020-09-28 PROBLEM — I10 ESSENTIAL HYPERTENSION: Status: ACTIVE | Noted: 2020-09-28

## 2020-09-28 PROBLEM — E11.9 TYPE 2 DIABETES MELLITUS WITHOUT COMPLICATION (HCC): Status: ACTIVE | Noted: 2020-09-28

## 2020-09-28 LAB
GLUCOSE BLD-MCNC: 115 MG/DL (ref 70–99)
GLUCOSE BLD-MCNC: 116 MG/DL (ref 70–99)
GLUCOSE BLD-MCNC: 119 MG/DL (ref 70–99)

## 2020-09-28 PROCEDURE — 99222 1ST HOSP IP/OBS MODERATE 55: CPT | Performed by: OBSTETRICS & GYNECOLOGY

## 2020-09-28 PROCEDURE — 97530 THERAPEUTIC ACTIVITIES: CPT

## 2020-09-28 PROCEDURE — 99232 SBSQ HOSP IP/OBS MODERATE 35: CPT | Performed by: INTERNAL MEDICINE

## 2020-09-28 PROCEDURE — APPSS15 APP SPLIT SHARED TIME 0-15 MINUTES: Performed by: NURSE PRACTITIONER

## 2020-09-28 PROCEDURE — 82962 GLUCOSE BLOOD TEST: CPT

## 2020-09-28 PROCEDURE — 6360000002 HC RX W HCPCS: Performed by: PSYCHIATRY & NEUROLOGY

## 2020-09-28 PROCEDURE — 2580000003 HC RX 258: Performed by: PSYCHIATRY & NEUROLOGY

## 2020-09-28 PROCEDURE — 94761 N-INVAS EAR/PLS OXIMETRY MLT: CPT

## 2020-09-28 PROCEDURE — 2140000000 HC CCU INTERMEDIATE R&B

## 2020-09-28 RX ADMIN — LEVETIRACETAM 500 MG: 100 INJECTION, SOLUTION INTRAVENOUS at 07:10

## 2020-09-28 ASSESSMENT — PAIN SCALES - PAIN ASSESSMENT IN ADVANCED DEMENTIA (PAINAD)
CONSOLABILITY: 0
TOTALSCORE: 0
CONSOLABILITY: 0
TOTALSCORE: 0
FACIALEXPRESSION: 0
NEGVOCALIZATION: 0
BREATHING: 0
NEGVOCALIZATION: 0
BREATHING: 0
CONSOLABILITY: 0
BREATHING: 0
BODYLANGUAGE: 0
BREATHING: 0
FACIALEXPRESSION: 0
FACIALEXPRESSION: 0
NEGVOCALIZATION: 0
TOTALSCORE: 0
CONSOLABILITY: 0
BODYLANGUAGE: 0
FACIALEXPRESSION: 0
TOTALSCORE: 0
NEGVOCALIZATION: 0
FACIALEXPRESSION: 0
NEGVOCALIZATION: 0
BODYLANGUAGE: 0
TOTALSCORE: 0
CONSOLABILITY: 0
BREATHING: 0

## 2020-09-28 ASSESSMENT — PAIN SCALES - GENERAL
PAINLEVEL_OUTOF10: 0
PAINLEVEL_OUTOF10: 0

## 2020-09-28 NOTE — PROGRESS NOTES
[facility] [address]   Progress Note  [facility] 0 1 2      Date: 2020   Patient: Naseem Roberts   : 1929   DOA: 2020   MRN: [id number]   ROOM#: [id number]     Admit Date: 2020     Collaborating Urologist on Call at time of admission: Dr. Cesar Pierce  CC:  Altered mental status and CVA  (poor historian)  Reason for Consult:  Acute Urinary Retention and Acute gross hematuria secondary to [facility] and goodson catheter trauma    Subjective:     Pain: none, no nausea and no vomiting,   Bowel Movement/Flatus:   Yes  Voiding: goodson catheter in place draining clear yellow urine    Pt resting comfortably in bed, denies any pain    Objective:    Vitals:    BP (!) 162/75   Pulse 58   Temp 96.8 °F (36 °C) (Axillary)   Resp 16   Ht 5' 7\" (1.702 m)   Wt 199 lb 8.3 oz (90.5 kg)   SpO2 94%   BMI 31.25 kg/m²    Temp  Av.6 °F (36.4 °C)  Min: 96.8 °F (36 °C)  Max: 98 °F (36.7 °C)       Intake/Output Summary (Last 24 hours) at 2020 0830  Last data filed at 2020 0541  Gross per 24 hour   Intake 10 ml   Output 500 ml   Net -490 ml       Physical Exam:   General appearance: alert, appears stated age, cooperative, no distress and slowed mentation  Head: Normocephalic, without obvious abnormality, atraumatic  Back: No CVA tenderness  Abdomen: Soft, non-tender, non-distended   : Goodson catheter in place draining clear yellow urine    Labs:   WBC:    Lab Results   Component Value Date    WBC 7.5 2020      Hemoglobin/Hematocrit:    Lab Results   Component Value Date    HGB 14.9 2020    HCT 47.6 2020      BMP:   Lab Results   Component Value Date     2020    K 3.8 2020     2020    CO2 24 2020    BUN 14 2020    LABALBU 4.0 2020    CREATININE 1.0 2020    CALCIUM 9.5 2020    GFRAA >60 2020    LABGLOM >60 2020      PT/INR:    Lab Results   Component Value Date    PROTIME 11.9 2020    INR 0.98 09/25/2020       Imaging:  Ct Head Wo Contrast    Result Date: 9/26/2020  EXAMINATION: CT OF THE HEAD WITHOUT CONTRAST  9/26/2020 6:23 pm TECHNIQUE: CT of the head was performed without the administration of intravenous contrast. Dose modulation, iterative reconstruction, and/or weight based adjustment of the mA/kV was utilized to reduce the radiation dose to as low as reasonably achievable. COMPARISON: CT head and CTA head 09/25/2020. Brain MRI 09/26/2020. HISTORY: ORDERING SYSTEM PROVIDED HISTORY: r/o bleed/cva TECHNOLOGIST PROVIDED HISTORY: Has a \"code stroke\" or \"stroke alert\" been called? ->No Reason for exam:->r/o bleed/cva Reason for Exam: r/o bleed/cva Acuity: Acute Type of Exam: Initial Additional signs and symptoms: none Relevant Medical/Surgical History: poor historian FINDINGS: BRAIN/VENTRICLES: There is no acute intracranial hemorrhage, mass effect or midline shift. No abnormal extra-axial fluid collection. The gray-white differentiation is maintained without evidence of an acute infarct. There is prominence of the ventricles and sulci due to global parenchymal volume loss. There are nonspecific areas of hypoattenuation within the periventricular and subcortical white matter, which likely represent chronic microvascular ischemic change. Chronic lacunar infarcts bilateral basal ganglia redemonstrated. Tiny focus of acute ischemia involving left cerebral peduncle on brain MRI earlier today is not evident on the CT exam. ORBITS: The visualized portion of the orbits demonstrate no acute abnormality. SINUSES: The visualized paranasal sinuses and mastoid air cells demonstrate no acute abnormality. SOFT TISSUES/SKULL: No acute abnormality of the visualized skull or soft tissues. Stable exam without CT evidence for acute intracranial abnormality.  Tiny focus of acute ischemia involving left cerebral peduncle on brain MRI earlier today is not evident on this CT exam.  Reference can be made to the MRI for additional information. Ct Head Wo Contrast    Result Date: 9/25/2020  EXAMINATION: CT OF THE HEAD WITHOUT CONTRAST  9/25/2020 7:21 pm TECHNIQUE: CT of the head was performed without the administration of intravenous contrast. Dose modulation, iterative reconstruction, and/or weight based adjustment of the mA/kV was utilized to reduce the radiation dose to as low as reasonably achievable. COMPARISON: None. HISTORY: ORDERING SYSTEM PROVIDED HISTORY: CVA TECHNOLOGIST PROVIDED HISTORY: Has a \"code stroke\" or \"stroke alert\" been called?-> yes Reason for exam:-> CVA Reason for Exam: CVA Acuity: Acute Type of Exam: Initial FINDINGS: BRAIN/VENTRICLES: There is no acute intracranial hemorrhage, mass effect or midline shift. No abnormal extra-axial fluid collection. The gray-white differentiation is maintained without evidence of an acute infarct. There is prominence of the ventricles and sulci due to global parenchymal volume loss. There are nonspecific areas of hypoattenuation within the periventricular and subcortical white matter, which likely represent chronic microvascular ischemic change. Multifocal probably remote lacunar stroke involving the bilateral basal ganglia and left centrum semiovale. ORBITS: The visualized portion of the orbits demonstrate no acute abnormality. SINUSES: The visualized paranasal sinuses and mastoid air cells demonstrate no acute abnormality. SOFT TISSUES/SKULL: No acute abnormality of the visualized skull or soft tissues. No acute intracranial abnormality. Multifocal probably remote lacunar stroke involving the bilateral basal ganglia and left centrum semiovale. Senescent changes. Per stroke alert protocol, results were called by Dr. Korin Chen to   [facility] on 9/25/2020 at 19:31. Xr Chest Portable    Result Date: 9/25/2020  EXAMINATION: ONE XRAY VIEW OF THE CHEST 9/25/2020 7:52 pm COMPARISON: None.  HISTORY: ORDERING SYSTEM PROVIDED HISTORY: cva TECHNOLOGIST PROVIDED bifurcations and bulbs causes 40% stenosis of the proximal internal carotid arteries. The external carotid arteries are patent. No dissection. VERTEBRAL ARTERIES: The left vertebral artery is normal in caliber. The right vertebral artery is hypoplastic with diminishing contrast enhancement along its length through the V2/V3 junction with no contrast seen in the V3 segment. SOFT TISSUES: The lung apices are clear. No cervical or superior mediastinal lymphadenopathy. The larynx and pharynx are unremarkable. No acute abnormality of the salivary and thyroid glands. BONES: There is no acute fracture or suspect osseous lesion. Mild C5-6 and C6-7 degenerative disc disease is noted. CTA HEAD: ANTERIOR CIRCULATION: Calcification of the cavernous internal carotid arteries without significant stenosis. Anterior cerebral arteries are normal in caliber with hypoplasia of the right A1 segment. There is severe stenosis of bilateral right middle cerebral artery anterior M2 segments. POSTERIOR CIRCULATION: No contrast in the right vertebral artery. Left vertebral and basilar arteries are normal in caliber. There is severe focal stenosis of the P2 segment left posterior cerebral artery and moderate focal stenosis of the P2 segment right posterior cerebral artery. OTHER: No dural venous sinus thrombosis on this non-dedicated study. BRAIN: No mass effect or midline shift. No extra-axial fluid collection. The gray-white differentiation is maintained. 1. No large vessel occlusion of the intracranial arteries. 2. 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 3. 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 4. 40% stenosis of the proximal internal carotid arteries. 5. No significant left vertebral artery stenosis.      Cta Head W Contrast    Result Date: 9/25/2020  EXAMINATION: CTA OF THE HEAD WITH CONTRAST; CTA OF THE NECK glands. BONES: There is no acute fracture or suspect osseous lesion. Mild C5-6 and C6-7 degenerative disc disease is noted. CTA HEAD: ANTERIOR CIRCULATION: Calcification of the cavernous internal carotid arteries without significant stenosis. Anterior cerebral arteries are normal in caliber with hypoplasia of the right A1 segment. There is severe stenosis of bilateral right middle cerebral artery anterior M2 segments. POSTERIOR CIRCULATION: No contrast in the right vertebral artery. Left vertebral and basilar arteries are normal in caliber. There is severe focal stenosis of the P2 segment left posterior cerebral artery and moderate focal stenosis of the P2 segment right posterior cerebral artery. OTHER: No dural venous sinus thrombosis on this non-dedicated study. BRAIN: No mass effect or midline shift. No extra-axial fluid collection. The gray-white differentiation is maintained. 1. No large vessel occlusion of the intracranial arteries. 2. 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 3. 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 4. 40% stenosis of the proximal internal carotid arteries. 5. No significant left vertebral artery stenosis. Mri Brain Without Contrast    Result Date: 9/26/2020  EXAMINATION: MRI OF THE BRAIN WITHOUT CONTRAST  9/26/2020 3:08 pm TECHNIQUE: Multiplanar multisequence MRI of the brain was performed without the administration of intravenous contrast. COMPARISON: None. HISTORY: ORDERING SYSTEM PROVIDED HISTORY: facial drooping TECHNOLOGIST PROVIDED HISTORY: Reason for exam:->facial drooping Reason for Exam: ams facial droop-pt unable to follow directions cough - repeated many scans-best images FINDINGS: Motion limited evaluation despite repeat imaging attempts.  INTRACRANIAL STRUCTURES/VENTRICLES: Diffuse parenchymal volume loss with moderate chronic white matter microvascular ischemic changes and chronic lacunar infarcts in the bilateral basal ganglia and cerebellum. Punctate focus of diffusion restriction and T2/FLAIR hyperintensity in the left cerebral peduncle and left anterior midbrain. No intracranial mass. No mass effect or midline shift. No evidence of an acute intracranial hemorrhage. The ventricles and sulci are normal in size and configuration. The sellar/suprasellar regions appear unremarkable. The normal signal voids within the major intracranial vessels appear maintained. ORBITS: The visualized portion of the orbits demonstrate no acute abnormality. SINUSES: The visualized paranasal sinuses and mastoid air cells are well aerated. BONES/SOFT TISSUES: The bone marrow signal intensity appears normal. The soft tissues demonstrate no acute abnormality. 1. Motion limited evaluation. 2. Tiny acute ischemic infarct in the left cerebral peduncle and anterior mid brain. 3. No hemorrhage or mass effect. 4. Diffuse parenchymal volume loss with moderate chronic white matter microvascular ischemic changes and chronic lacunar infarcts in the bilateral basal ganglia. Assessment & Plan:      Shy Cali is a 80y.o. year old male admitted 9/25/2020 for stroke-like episode. Pt is DNR-CCA    1) Gross Hematuria: Resolved   Secondary to [facility] and catheter trauma   Hgb 14.9   Urine clear yellow today   On Plavix and ASA   Recommend manual bladder irrigation prn clots  2) Acute Urinary Retention   Continue goodson catheter for now   Recommend voiding trial prior to discharge, sooner if mental status improves   Continue Flomax    Pt stable from a  standpoint. Will sign off, please call with any questions. Patient seen and examined, chart reviewed.      Electronically signed by Heather Pillai PA-C on 9/28/2020 at 8:30 AM

## 2020-09-28 NOTE — PROGRESS NOTES
NEUROLOGY NOTE  DR. Acosta Roberts MD.  -------------------------------------------------  Subjective:    Pt is drowsy today    Pt and famiy denies any new symptoms    Pt not eating much    Pt is able to communicate some today    Denies any new symptoms    Interacting with pts family    Doing better. Denies any new symptoms. Denies headache nausea vomiting dizziness    Denies numbness weakness extremities    Denies blurring of vision double vision    Objective:    BP (!) 140/80   Pulse (!) 45   Temp 97.5 °F (36.4 °C) (Axillary)   Resp 16   Ht 5' 7\" (1.702 m)   Wt 199 lb 8.3 oz (90.5 kg)   SpO2 94%   BMI 31.25 kg/m²   HEENT nl      Neuro exam    Alert Oriented  X 2  Follow simple commands  Aphasic expressive   EOMI Pupils 3 mm thuy  5/5 all 4 extremities      RADIOLOGY  -----------------    Ct Head Wo Contrast    Result Date: 9/26/2020  EXAMINATION: CT OF THE HEAD WITHOUT CONTRAST  9/26/2020 6:23 pm TECHNIQUE: CT of the head was performed without the administration of intravenous contrast. Dose modulation, iterative reconstruction, and/or weight based adjustment of the mA/kV was utilized to reduce the radiation dose to as low as reasonably achievable. COMPARISON: CT head and CTA head 09/25/2020. Brain MRI 09/26/2020. HISTORY: ORDERING SYSTEM PROVIDED HISTORY: r/o bleed/cva TECHNOLOGIST PROVIDED HISTORY: Has a \"code stroke\" or \"stroke alert\" been called? ->No Reason for exam:->r/o bleed/cva Reason for Exam: r/o bleed/cva Acuity: Acute Type of Exam: Initial Additional signs and symptoms: none Relevant Medical/Surgical History: poor historian FINDINGS: BRAIN/VENTRICLES: There is no acute intracranial hemorrhage, mass effect or midline shift. No abnormal extra-axial fluid collection. The gray-white differentiation is maintained without evidence of an acute infarct. There is prominence of the ventricles and sulci due to global parenchymal volume loss.  There are nonspecific areas of hypoattenuation within the periventricular and subcortical white matter, which likely represent chronic microvascular ischemic change. Chronic lacunar infarcts bilateral basal ganglia redemonstrated. Tiny focus of acute ischemia involving left cerebral peduncle on brain MRI earlier today is not evident on the CT exam. ORBITS: The visualized portion of the orbits demonstrate no acute abnormality. SINUSES: The visualized paranasal sinuses and mastoid air cells demonstrate no acute abnormality. SOFT TISSUES/SKULL: No acute abnormality of the visualized skull or soft tissues. Stable exam without CT evidence for acute intracranial abnormality. Tiny focus of acute ischemia involving left cerebral peduncle on brain MRI earlier today is not evident on this CT exam.  Reference can be made to the MRI for additional information. Ct Head Wo Contrast    Result Date: 9/25/2020  EXAMINATION: CT OF THE HEAD WITHOUT CONTRAST  9/25/2020 7:21 pm TECHNIQUE: CT of the head was performed without the administration of intravenous contrast. Dose modulation, iterative reconstruction, and/or weight based adjustment of the mA/kV was utilized to reduce the radiation dose to as low as reasonably achievable. COMPARISON: None. HISTORY: ORDERING SYSTEM PROVIDED HISTORY: CVA TECHNOLOGIST PROVIDED HISTORY: Has a \"code stroke\" or \"stroke alert\" been called?-> yes Reason for exam:-> CVA Reason for Exam: CVA Acuity: Acute Type of Exam: Initial FINDINGS: BRAIN/VENTRICLES: There is no acute intracranial hemorrhage, mass effect or midline shift. No abnormal extra-axial fluid collection. The gray-white differentiation is maintained without evidence of an acute infarct. There is prominence of the ventricles and sulci due to global parenchymal volume loss. There are nonspecific areas of hypoattenuation within the periventricular and subcortical white matter, which likely represent chronic microvascular ischemic change.   Multifocal probably remote lacunar stroke involving the bilateral basal ganglia and left centrum semiovale. ORBITS: The visualized portion of the orbits demonstrate no acute abnormality. SINUSES: The visualized paranasal sinuses and mastoid air cells demonstrate no acute abnormality. SOFT TISSUES/SKULL: No acute abnormality of the visualized skull or soft tissues. No acute intracranial abnormality. Multifocal probably remote lacunar stroke involving the bilateral basal ganglia and left centrum semiovale. Senescent changes. Per stroke alert protocol, results were called by Dr. Katelin Ernandez to   [address] on 9/25/2020 at 19:31. Xr Chest Portable    Result Date: 9/25/2020  EXAMINATION: ONE XRAY VIEW OF THE CHEST 9/25/2020 7:52 pm COMPARISON: None. HISTORY: ORDERING SYSTEM PROVIDED HISTORY: cva TECHNOLOGIST PROVIDED HISTORY: Reason for exam:->cva Reason for Exam: CVA Acuity: Acute Type of Exam: Initial Additional signs and symptoms: unknown Relevant Medical/Surgical History: none FINDINGS: Cardiac silhouette is enlarged. Atherosclerotic changes of the aorta. Chronic interstitial changes of the lungs. No focal consolidation, pleural effusion, or pneumothorax identified. Osseous structures appear intact. 1. Cardiomegaly. 2. No focal consolidation identified. Cta Neck W Contrast    Result Date: 9/25/2020  EXAMINATION: CTA OF THE HEAD WITH CONTRAST; CTA OF THE NECK 9/25/2020 7:25 pm: TECHNIQUE: CTA of the head/brain was performed with the administration of intravenous contrast. Multiplanar reformatted images are provided for review. MIP images are provided for review. Dose modulation, iterative reconstruction, and/or weight based adjustment of the mA/kV was utilized to reduce the radiation dose to as low as reasonably achievable.; CTA of the neck was performed with the administration of intravenous contrast. Multiplanar reformatted images are provided for review. MIP images are provided for review.  Stenosis of the internal carotid arteries measured using NASCET criteria. Dose modulation, iterative reconstruction, and/or weight based adjustment of the mA/kV was utilized to reduce the radiation dose to as low as reasonably achievable. COMPARISON: None. HISTORY: ORDERING SYSTEM PROVIDED HISTORY: cva TECHNOLOGIST PROVIDED HISTORY: Has a \"code stroke\" or \"stroke alert\" been called? ->Yes Reason for exam:->cva Reason for Exam: cva Acuity: Acute Type of Exam: Initial FINDINGS: CTA NECK: AORTIC ARCH/ARCH VESSELS: Mild atherosclerotic plaque at the aortic arch without aneurysm or dissection. Conventional 3 vessel arch anatomy. Mild atherosclerotic plaque involving the innominate and subclavian arteries without stenosis. CAROTID ARTERIES: The common carotid arteries are normal in caliber. Atherosclerotic plaque at the carotid bifurcations and bulbs causes 40% stenosis of the proximal internal carotid arteries. The external carotid arteries are patent. No dissection. VERTEBRAL ARTERIES: The left vertebral artery is normal in caliber. The right vertebral artery is hypoplastic with diminishing contrast enhancement along its length through the V2/V3 junction with no contrast seen in the V3 segment. SOFT TISSUES: The lung apices are clear. No cervical or superior mediastinal lymphadenopathy. The larynx and pharynx are unremarkable. No acute abnormality of the salivary and thyroid glands. BONES: There is no acute fracture or suspect osseous lesion. Mild C5-6 and C6-7 degenerative disc disease is noted. CTA HEAD: ANTERIOR CIRCULATION: Calcification of the cavernous internal carotid arteries without significant stenosis. Anterior cerebral arteries are normal in caliber with hypoplasia of the right A1 segment. There is severe stenosis of bilateral right middle cerebral artery anterior M2 segments. POSTERIOR CIRCULATION: No contrast in the right vertebral artery. Left vertebral and basilar arteries are normal in caliber.   There is severe focal stenosis of the P2 segment left posterior cerebral artery and moderate focal stenosis of the P2 segment right posterior cerebral artery. OTHER: No dural venous sinus thrombosis on this non-dedicated study. BRAIN: No mass effect or midline shift. No extra-axial fluid collection. The gray-white differentiation is maintained. 1. No large vessel occlusion of the intracranial arteries. 2. 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 3. 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 4. 40% stenosis of the proximal internal carotid arteries. 5. No significant left vertebral artery stenosis. Cta Head W Contrast    Result Date: 9/25/2020  EXAMINATION: CTA OF THE HEAD WITH CONTRAST; CTA OF THE NECK 9/25/2020 7:25 pm: TECHNIQUE: CTA of the head/brain was performed with the administration of intravenous contrast. Multiplanar reformatted images are provided for review. MIP images are provided for review. Dose modulation, iterative reconstruction, and/or weight based adjustment of the mA/kV was utilized to reduce the radiation dose to as low as reasonably achievable.; CTA of the neck was performed with the administration of intravenous contrast. Multiplanar reformatted images are provided for review. MIP images are provided for review. Stenosis of the internal carotid arteries measured using NASCET criteria. Dose modulation, iterative reconstruction, and/or weight based adjustment of the mA/kV was utilized to reduce the radiation dose to as low as reasonably achievable. COMPARISON: None. HISTORY: ORDERING SYSTEM PROVIDED HISTORY: cva TECHNOLOGIST PROVIDED HISTORY: Has a \"code stroke\" or \"stroke alert\" been called? ->Yes Reason for exam:->cva Reason for Exam: cva Acuity: Acute Type of Exam: Initial FINDINGS: CTA NECK: AORTIC ARCH/ARCH VESSELS: Mild atherosclerotic plaque at the aortic arch without aneurysm or dissection.   Conventional 3 right. 4. 40% stenosis of the proximal internal carotid arteries. 5. No significant left vertebral artery stenosis. Mri Brain Without Contrast    Result Date: 9/26/2020  EXAMINATION: MRI OF THE BRAIN WITHOUT CONTRAST  9/26/2020 3:08 pm TECHNIQUE: Multiplanar multisequence MRI of the brain was performed without the administration of intravenous contrast. COMPARISON: None. HISTORY: ORDERING SYSTEM PROVIDED HISTORY: facial drooping TECHNOLOGIST PROVIDED HISTORY: Reason for exam:->facial drooping Reason for Exam: ams facial droop-pt unable to follow directions cough - repeated many scans-best images FINDINGS: Motion limited evaluation despite repeat imaging attempts. INTRACRANIAL STRUCTURES/VENTRICLES: Diffuse parenchymal volume loss with moderate chronic white matter microvascular ischemic changes and chronic lacunar infarcts in the bilateral basal ganglia and cerebellum. Punctate focus of diffusion restriction and T2/FLAIR hyperintensity in the left cerebral peduncle and left anterior midbrain. No intracranial mass. No mass effect or midline shift. No evidence of an acute intracranial hemorrhage. The ventricles and sulci are normal in size and configuration. The sellar/suprasellar regions appear unremarkable. The normal signal voids within the major intracranial vessels appear maintained. ORBITS: The visualized portion of the orbits demonstrate no acute abnormality. SINUSES: The visualized paranasal sinuses and mastoid air cells are well aerated. BONES/SOFT TISSUES: The bone marrow signal intensity appears normal. The soft tissues demonstrate no acute abnormality. 1. Motion limited evaluation. 2. Tiny acute ischemic infarct in the left cerebral peduncle and anterior mid brain. 3. No hemorrhage or mass effect. 4. Diffuse parenchymal volume loss with moderate chronic white matter microvascular ischemic changes and chronic lacunar infarcts in the bilateral basal ganglia.        LAB RESULTS  --------------------    Recent Results (from the past 24 hour(s))   POCT Glucose    Collection Time: 09/27/20 10:15 PM   Result Value Ref Range    POC Glucose 131 (H) 70 - 99 MG/DL   POCT Glucose    Collection Time: 09/28/20  8:01 AM   Result Value Ref Range    POC Glucose 119 (H) 70 - 99 MG/DL   POCT Glucose    Collection Time: 09/28/20 11:50 AM   Result Value Ref Range    POC Glucose 115 (H) 70 - 99 MG/DL   POCT Glucose    Collection Time: 09/28/20  5:00 PM   Result Value Ref Range    POC Glucose 116 (H) 70 - 99 MG/DL         Medical problems    Patient Active Problem List:     Persistent atrial fibrillation     Abnormal EKG     Dementia without behavioral disturbance (HCC)     Loose stools     Stroke-like episode     Altered mental status      ASSESSMENT:  ---------------------    Left midbrain cerebral peduncle acute infarct    TIA/CVA     ? 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     Discussed plan of care with pts daughter. Pt is doing some better today and is communicating wih his family.         Electronically signed by Irma Rodríguez MD on 9/28/2020 at 7:05 PM

## 2020-09-28 NOTE — PROGRESS NOTES
Occupational Therapy  Chart reviewed. Discussed with CARR. Plan to hold further service and monitor chart. Pt not medically stable and unable to tolerate skilled therapy.   Alverda Mohs, [place], [place]   [id number]   12:43 PM, 9/28/2020

## 2020-09-28 NOTE — PROGRESS NOTES
[id number] [facility] SPEECH/LANGUAGE PATHOLOGY    Ophelia Mays  9/28/2020  [phone]    Attempted to see Ophelia Mays for dysphagia follow-up. Pt lethargic, not adequately waking for participation. SLP will reattempt as schedule allows.     Nicolasa Matias MA, CCC-SLP  9/28/2020  9:06 AM

## 2020-09-28 NOTE — CARE COORDINATION
[facility] notified CM that she had a meeting with pt's family today. She informed CM that she will be back in the am to reassess for possible [facility] vs Home with wife and Hospice.   TE

## 2020-09-28 NOTE — PLAN OF CARE
Problem: Falls - Risk of:  Goal: Will remain free from falls  Description: Will remain free from falls  Outcome: Ongoing  Goal: Absence of physical injury  Description: Absence of physical injury  Outcome: Ongoing     Problem: Skin Integrity:  Goal: Will show no infection signs and symptoms  Description: Will show no infection signs and symptoms  Outcome: Ongoing  Goal: Absence of new skin breakdown  Description: Absence of new skin breakdown  Outcome: Ongoing     Problem: HEMODYNAMIC STATUS  Goal: Patient has stable vital signs and fluid balance  Outcome: Ongoing     Problem: ACTIVITY INTOLERANCE/IMPAIRED MOBILITY  Goal: Mobility/activity is maintained at optimum level for patient  Outcome: Ongoing     Problem: COMMUNICATION IMPAIRMENT  Goal: Ability to express needs and understand communication  Outcome: Ongoing     Problem: Pain:  Goal: Pain level will decrease  Description: Pain level will decrease  Outcome: Ongoing  Goal: Control of acute pain  Description: Control of acute pain  Outcome: Ongoing  Goal: Control of chronic pain  Description: Control of chronic pain  Outcome: Ongoing     Problem: Confusion - Acute:  Goal: Absence of continued neurological deterioration signs and symptoms  Description: Absence of continued neurological deterioration signs and symptoms  Outcome: Ongoing  Goal: Mental status will be restored to baseline  Description: Mental status will be restored to baseline  Outcome: Ongoing     Problem: Discharge Planning:  Goal: Ability to perform activities of daily living will improve  Description: Ability to perform activities of daily living will improve  Outcome: Ongoing  Goal: Participates in care planning  Description: Participates in care planning  Outcome: Ongoing     Problem: Injury - Risk of, Physical Injury:  Goal: Will remain free from falls  Description: Will remain free from falls  Outcome: Ongoing  Goal: Absence of physical injury  Description: Absence of physical injury  Outcome: Ongoing     Problem: Mood - Altered:  Goal: Mood stable  Description: Mood stable  Outcome: Ongoing  Goal: Absence of abusive behavior  Description: Absence of abusive behavior  Outcome: Ongoing  Goal: Verbalizations of feeling emotionally comfortable while being cared for will increase  Description: Verbalizations of feeling emotionally comfortable while being cared for will increase  Outcome: Ongoing     Problem: Psychomotor Activity - Altered:  Goal: Absence of psychomotor disturbance signs and symptoms  Description: Absence of psychomotor disturbance signs and symptoms  Outcome: Ongoing     Problem: Sensory Perception - Impaired:  Goal: Demonstrations of improved sensory functioning will increase  Description: Demonstrations of improved sensory functioning will increase  Outcome: Ongoing  Goal: Decrease in sensory misperception frequency  Description: Decrease in sensory misperception frequency  Outcome: Ongoing  Goal: Able to refrain from responding to false sensory perceptions  Description: Able to refrain from responding to false sensory perceptions  Outcome: Ongoing  Goal: Demonstrates accurate environmental perceptions  Description: Demonstrates accurate environmental perceptions  Outcome: Ongoing  Goal: Able to distinguish between reality-based and nonreality-based thinking  Description: Able to distinguish between reality-based and nonreality-based thinking  Outcome: Ongoing  Goal: Able to interrupt nonreality-based thinking  Description: Able to interrupt nonreality-based thinking  Outcome: Ongoing     Problem: Sleep Pattern Disturbance:  Goal: Appears well-rested  Description: Appears well-rested  Outcome: Ongoing

## 2020-09-28 NOTE — PROGRESS NOTES
Occupational Therapy  . Occupational Therapy Treatment Note  Name: Radha Ding MRN: [id number] :   [id number]   Date:  2020   Admission Date: 2020 Room:  [address] [place], [place] [address]-A   Restrictions/Precautions:  Restrictions/Precautions  Restrictions/Precautions: Fall Risk, General Precautions, Up as Tolerated     Communication with other providers:  Per nurse Bernie Espinoza, pt's family will be taking patient home on hospice and that pt's heart is failing, HR dips very low at times. Subjective:  Patient states:  Pt opened eyes and family at bedside urging participation. Pain:   Location, Type, Intensity (0/10 to 10/10): Unable to rate    Objective:    Observation:  Pt in semi-fowlers in bed, eyes closed. With verbal / tactile cues and urging of spouse and daughter, patient opens eyes and participated as able. Arousal level fluctuates, pt opens / closes eyes intermittently during Tx session. Objective Measures:  Telemetry, O2 sats on room air 93-98%, HR ranged widely, 39-52. Treatment, including education:  Therapeutic Activity Training:   Therapeutic activity training was instructed today. Cues were given for safety, sequence, UE/LE placement, awareness, and balance. Activities performed today included bed mobility training, sup-sit, sit-stand, SPT. With emphasis on BUE functional reaching, pt required Min A for support at elbow to lift RUE to face and wash face, required increased time. With emphasis on bed mobility, pt followed cues for turning head, did not reach c BUE, required Dep assist for repositioning more on R side c pillow support under L back and hips. Pt nods head that this is more comfortable position. All therapeutic intervention performed c emphasis on arousal, BUE functional reaching to maintain strength, endurance and act tolerance for inc Indep c ADL tasks, func transfers / mobility.      Safety  Patient safely in bed + alarm at end of session, with call light/phone in reach, and

## 2020-09-28 NOTE — PROGRESS NOTES
Cardiology Progress Note     Today's Plan: cardiology to sign off, please call if further consultation is needed. Admit Date:  9/25/2020    Consult reason/ Seen today for: bradycardia    Subjective and  Overnight Events:  He remains confused this am - denies any dizziness or lightheadedness      Assessment / Plan / Recommendation:     1. Bradycardia- episodes of bradycardia with rate in the 30's - TSH normal - hold rate lowering medications - per notes daughter would like conservative management - holter is pending   2. Atrial fib-he is a high risk for anticoagulation secondary to lethargy/ falls - no CCB or BB as he is having episode of significant bradycardia with transient episodes of HR in the 20-30's   3. TIA/ vs CVA- neurology following - on ASA and statin   4. Agree with palliative care consult. Cardiology to sign off, please call if further consultation is needed. History of Presenting Illness:    Chief complain on admission : 80 y. o.year old who is admitted for  Chief Complaint   Patient presents with    Altered Mental Status        Past medical history:    has a past medical history of Diabetes ([facility] [address] Past surgical history:   has no past surgical history on file. Social History:   reports that he quit smoking about 32 years ago. He has never used smokeless tobacco. He reports current alcohol use. He reports that he does not use drugs. Family history:  family history includes Cancer in his sister; Diabetes in his brother.     No Known Allergies    Review of Systems:   All 14 systems were reviewed and are negative  Except for the positive findings which are documented     BP (!) 162/75   Pulse 58   Temp 96.8 °F (36 °C) (Axillary)   Resp 16   Ht 5' 7\" (1.702 m)   Wt 199 lb 8.3 oz (90.5 kg)   SpO2 94%   BMI 31.25 kg/m²       Intake/Output Summary (Last 24 hours) at 9/28/2020 1102  Last data filed at 9/28/2020 0541  Gross per 24 hour   Intake 10 ml   Output 500 ml   Net -490 ml       Physical Exam:  Constitutional:  Well developed, Well nourished, No acute distress  HENT:  Normocephalic, Atraumatic, Bilateral external ears normal,  Nose normal.   Neck- trachea is midline  Eyes:  PEERL, Conjunctiva normal  Respiratory:  Normal breath sounds, No respiratory distress, No wheezing, No chest tenderness. Cardiovascular:  IRRR,  no murmurs appreciated, No rubs appreciated, No gallops appreciated, JVP not elevated  Abdomen/GI:  Soft, No tenderness  Musculoskeletal:  Intact distal pulses, no edema to lower legs,  No tenderness, No cyanosis, No clubbing. Integument:  Warm, Dry  Lymphatic:  No lymphadenopathy noted. Neurologic:  Alert & confused   Psychiatric:  Affect and Mood :pleasant     Medications:    atropine  0.5 mg Intravenous Once    sodium chloride flush  10 mL Intravenous 2 times per day    atorvastatin  20 mg Oral Nightly    aspirin  81 mg Oral Daily    Or    aspirin  300 mg Rectal Daily    insulin lispro  0-12 Units Subcutaneous TID WC    insulin lispro  0-6 Units Subcutaneous Nightly    [Held by provider] enoxaparin  1 mg/kg Subcutaneous BID    tamsulosin  0.4 mg Oral Daily    levetiracetam  500 mg Intravenous Q12H    clopidogrel  75 mg Oral Daily      dextrose       sodium chloride flush, polyethylene glycol, promethazine **OR** ondansetron, labetalol, glucose, dextrose, glucagon (rDNA), dextrose    Lab Data:  CBC:   Recent Labs     09/25/20 1942 09/26/20  0610   WBC 6.1 7.5   HGB 14.2 14.9   HCT 44.0 47.6   MCV 96.5 96.7    255     BMP:   Recent Labs     09/25/20 1942 09/26/20  0610 09/27/20  0406   * 139 140   K 4.1 3.9 3.8   CL 98* 102 104   CO2 24 22 24   BUN 20 17 14   CREATININE 0.9 0.9 1.0     PT/INR:   Recent Labs     09/25/20 2006   PROTIME 11.9   INR 0.98     BNP:  No results for input(s): PROBNP in the last 72 hours.   TROPONIN:   Recent Labs     09/25/20 1942 09/26/20  0040 09/26/20  0610   TROPONINT <0.010 <0.010 <0.010        ECHO:          All labs, medications and tests reviewed by myself, continue all other medications of all above medical condition listed as is except for changes mentioned above. Thank you   Please call with questions. Electronically signed by IVON Pettit CNP on 9/28/2020 at 11:02 AM    I have seen ,spoken to  and examined this patient personally, independently of the nurse practitioner. I have reviewed the hospital care given to date and reviewed all pertinent labs and imaging. The plan was developed mutually at the time of the visit with the patient,  NP  and myself. I have spoken with patient, nursing staff and provided written and verbal instructions . The above note has been reviewed and I agree with the assessment, diagnosis, and treatment plan with changes made by me as follows     CARDIOLOGY ATTENDING ADDENDUM    HPI:  I have reviewed the above HPI  And agree with above   Rocky Munguia is a 80 y. o.year old who and presents with had concerns including Altered Mental Status. Chief Complaint   Patient presents with    Altered Mental Status     Interval history:  No change    Physical Exam:  General:  Awake,   Head:normal  Eye:normal  Neck:  No JVD   Chest:  Clear to auscultation, respiration easy  Cardiovascular:  RRR S1S2  Abdomen:   nontender  Extremities:  no edema  Pulses; palpable  Neuro: grossly normal      MEDICAL DECISION MAKING;    I agree with the above plan, which was planned by myself and discussed with NP.   No PPM per family  No further testing        Santo Martinez MD [facility]

## 2020-09-28 NOTE — CARE COORDINATION
Spoke with patient's wife and daughter Marc Lockhart at bedside. Patient is from home with his wife and was normally able to ambulate household distances with a walker and was assisted with his ADL's. Marc Richy stated that patient had a home health aid that came once a week to assist patient with his bath from [employer]. Patient has PCP and insurance to assist with RX coverage. CM discussed Hospice Consult with family . They are agreeable for consult and [facility]. Referral called to Deann at [facility] .

## 2020-09-28 NOTE — PROGRESS NOTES
Hospitalist Progress Note      Name:  Uziel Coronel /Age/Sex: 1929  (80 y.o. male)   MRN & CSN:  [id number] & [id number] Admission Date/Time: 2020  7:19 PM   Location:  [facility]64- PCP: Cale Lujan MD         Hospital Day: 4    Assessment and Plan:   Joshua Beckwith a 80 y.o.  male  who presents with AMS.    #Left Facial Droop: CVA in left cerebral peduncle and anterior mid brain  #Seizure? ?  -Pt has hx of multiple CVA  -he has a hx of afib not on anticoagulation. Cardiology discussed with family who do not want anticoagulation at this time  -CTA head and neck: Right vertebral artery with occlusion; bilateral posterior cerebral artery stenosis; 40% stenosis of proximal ICA. -CT head: Multifocal probably remote lacunar strokes  -continue aspirin, plavix and statin  -continue keppra  -[address] [place] [address]  -MRI: Tiny acute ischemic infarct in the left cerebral peduncle and anterior mid brain. diffuse parenchymal loss,moderate chronic white matter microvascular changes. Chronic lacunar infarcts in bilateral basal ganglia  -neurology on board  -PT/OT consulted     #Acute Metabolic Encephalopathy: Hypoglycemia vs CVA versus vs seizure  -Patient is alert but oriented to self only  -concerns for seizure   -patient started on keppera  -B12 and folate within normal limits  -patient was hypoglycemic on admission and had issues with swallowing   -TSH within normal limits  -Neurology on board logy on board     #Dyspshagia  -patient was hypoglycemic on admission and had issues with swallowing   -Speech therapy consulted: Dysphagia 2 diet with thin liquids on general aspiration precautions. recommend giving pills in pureed or pudding consistency (whole okay).  SLP pending     #Urinary retention  #hematuria: traumatic  -At bedside patient was found to have about 900 cc of urine   -continue Flomax   -Mcclain catheter in place  -UA and cultures sent  -Urology consulted: Agree to continue present erythema, discharge, or conjunctivitis. HENT Mucous membranes are moist. Oral pharynx without exudates, no evidence of thrush. NECK Supple, no apparent thyromegaly or masses. RESP Clear to auscultation, no wheezes, rales or rhonchi. Symmetric chest movement while on room air. CARDIO/VASC S1/S2 auscultated. Regular rate without appreciable murmurs, rubs, or gallops. No JVD or carotid bruits. Peripheral pulses equal bilaterally and palpable. No peripheral edema. GI Abdomen is soft without significant tenderness, masses, or guarding. Bowel sounds are normoactive. Rectal exam deferred.  No costovertebral angle tenderness. Normal appearing external genitalia. Mcclain catheter is not present. HEME/LYMPH No palpable cervical lymphadenopathy and no hepatosplenomegaly. No petechiae or ecchymoses. MSK No gross joint deformities. SKIN Normal coloration, warm, dry. NEURO Cranial nerves appear grossly intact, normal speech, no lateralizing weakness. PSYCH Awake, alert, oriented x 1-2. Affect appropriate.     Medications:   Medications:    atropine  0.5 mg Intravenous Once    sodium chloride flush  10 mL Intravenous 2 times per day    atorvastatin  20 mg Oral Nightly    aspirin  81 mg Oral Daily    Or    aspirin  300 mg Rectal Daily    insulin lispro  0-12 Units Subcutaneous TID WC    insulin lispro  0-6 Units Subcutaneous Nightly    [Held by provider] enoxaparin  1 mg/kg Subcutaneous BID    tamsulosin  0.4 mg Oral Daily    levetiracetam  500 mg Intravenous Q12H    clopidogrel  75 mg Oral Daily      Infusions:    dextrose       PRN Meds: sodium chloride flush, 10 mL, PRN  polyethylene glycol, 17 g, Daily PRN  promethazine, 12.5 mg, Q6H PRN    Or  ondansetron, 4 mg, Q6H PRN  labetalol, 10 mg, Q10 Min PRN  glucose, 15 g, PRN  dextrose, 12.5 g, PRN  glucagon (rDNA), 1 mg, PRN  dextrose, 100 mL/hr, PRN          Electronically signed by Rema Bradshaw MD on 9/28/2020 at 10:54 AM

## 2020-09-28 NOTE — CARE COORDINATION
NH PREDICT TOOL-- uploaded to Media Tab    NH Predict Tool Recommendation: Consider SNF for greater functional gain if condition improves w/ anticipated los of 20-24 days    PT Recommendation: SNF please continue to assess discharge recommendation throughout hospital stay as suspect pt may be near his baseline but functioning poorly in unfamiliar environment due to advanced dementia. OT Recommendation: Subacute/Skilled Nursing Facility    Current Discharge Planning: Spouse and Dtr meet w/ Dr Rosaura Orr. After discussion, family interested in Hospice Consult to discuss GIP vs Home w/ Hospice. Per Dr Compa Rodriguez note Case Mgt was notified of Deborah Delatorre. Collaboration w/ Case Mgt:  [facility], . Confirmed family has decided upon GIP.     Ronit Mail RN, CTN

## 2020-09-28 NOTE — PROGRESS NOTES
Physical Therapy  Pt and family deciding on hospice, pt did not tanya OT well, discussed with PT and will place pt on hold. Brenda Leija.  Linda Odonnell PTA

## 2020-09-29 ENCOUNTER — APPOINTMENT (OUTPATIENT)
Dept: CT IMAGING | Age: 85
DRG: 064 | End: 2020-09-29
Payer: MEDICARE

## 2020-09-29 LAB
GLUCOSE BLD-MCNC: 129 MG/DL (ref 70–99)
GLUCOSE BLD-MCNC: 178 MG/DL (ref 70–99)
HOMOCYSTEINE: 11.8 UMOL/L (ref 0–10)
REASON FOR REJECTION: NORMAL
REJECTED TEST: NORMAL

## 2020-09-29 PROCEDURE — 2140000000 HC CCU INTERMEDIATE R&B

## 2020-09-29 PROCEDURE — 36415 COLL VENOUS BLD VENIPUNCTURE: CPT

## 2020-09-29 PROCEDURE — 72192 CT PELVIS W/O DYE: CPT

## 2020-09-29 PROCEDURE — 6370000000 HC RX 637 (ALT 250 FOR IP): Performed by: PSYCHIATRY & NEUROLOGY

## 2020-09-29 PROCEDURE — 82962 GLUCOSE BLOOD TEST: CPT

## 2020-09-29 PROCEDURE — 2580000003 HC RX 258: Performed by: HOSPITALIST

## 2020-09-29 PROCEDURE — 6370000000 HC RX 637 (ALT 250 FOR IP): Performed by: INTERNAL MEDICINE

## 2020-09-29 PROCEDURE — 6370000000 HC RX 637 (ALT 250 FOR IP): Performed by: PHYSICIAN ASSISTANT

## 2020-09-29 PROCEDURE — 6370000000 HC RX 637 (ALT 250 FOR IP): Performed by: HOSPITALIST

## 2020-09-29 PROCEDURE — 94761 N-INVAS EAR/PLS OXIMETRY MLT: CPT

## 2020-09-29 PROCEDURE — 83090 ASSAY OF HOMOCYSTEINE: CPT

## 2020-09-29 RX ORDER — CLOPIDOGREL BISULFATE 75 MG/1
75 TABLET ORAL DAILY
Qty: 30 TABLET | Refills: 3 | Status: SHIPPED | OUTPATIENT
Start: 2020-09-30

## 2020-09-29 RX ORDER — OXYBUTYNIN CHLORIDE 5 MG/1
5 TABLET ORAL 3 TIMES DAILY
Status: DISCONTINUED | OUTPATIENT
Start: 2020-09-29 | End: 2020-09-30 | Stop reason: HOSPADM

## 2020-09-29 RX ORDER — ATORVASTATIN CALCIUM 20 MG/1
20 TABLET, FILM COATED ORAL NIGHTLY
Qty: 30 TABLET | Refills: 3 | Status: SHIPPED | OUTPATIENT
Start: 2020-09-29

## 2020-09-29 RX ORDER — TAMSULOSIN HYDROCHLORIDE 0.4 MG/1
0.4 CAPSULE ORAL DAILY
Qty: 30 CAPSULE | Refills: 3 | Status: SHIPPED | OUTPATIENT
Start: 2020-09-30

## 2020-09-29 RX ORDER — ASPIRIN 81 MG/1
81 TABLET ORAL DAILY
Qty: 30 TABLET | Refills: 3 | Status: SHIPPED | OUTPATIENT
Start: 2020-09-30

## 2020-09-29 RX ADMIN — SODIUM CHLORIDE, PRESERVATIVE FREE 10 ML: 5 INJECTION INTRAVENOUS at 23:26

## 2020-09-29 RX ADMIN — ASPIRIN 81 MG: 81 TABLET, COATED ORAL at 08:30

## 2020-09-29 RX ADMIN — TAMSULOSIN HYDROCHLORIDE 0.4 MG: 0.4 CAPSULE ORAL at 08:31

## 2020-09-29 RX ADMIN — CLOPIDOGREL BISULFATE 75 MG: 75 TABLET ORAL at 08:30

## 2020-09-29 ASSESSMENT — PAIN SCALES - PAIN ASSESSMENT IN ADVANCED DEMENTIA (PAINAD)
BODYLANGUAGE: 0
CONSOLABILITY: 0
FACIALEXPRESSION: 0
FACIALEXPRESSION: 0
NEGVOCALIZATION: 0
TOTALSCORE: 0
BREATHING: 0
NEGVOCALIZATION: 0
TOTALSCORE: 0
CONSOLABILITY: 0
BREATHING: 0
BODYLANGUAGE: 0
BODYLANGUAGE: 0
TOTALSCORE: 0
BREATHING: 0
NEGVOCALIZATION: 0
CONSOLABILITY: 0
TOTALSCORE: 0
FACIALEXPRESSION: 0
NEGVOCALIZATION: 0
TOTALSCORE: 0
BODYLANGUAGE: 0
BREATHING: 0
FACIALEXPRESSION: 0
FACIALEXPRESSION: 0
NEGVOCALIZATION: 0
NEGVOCALIZATION: 0
FACIALEXPRESSION: 0
FACIALEXPRESSION: 0
BREATHING: 0
NEGVOCALIZATION: 0
BREATHING: 0
CONSOLABILITY: 0
BODYLANGUAGE: 0
BREATHING: 0
BODYLANGUAGE: 0
TOTALSCORE: 0
FACIALEXPRESSION: 0
CONSOLABILITY: 0
CONSOLABILITY: 0
NEGVOCALIZATION: 0
BREATHING: 0
FACIALEXPRESSION: 0
CONSOLABILITY: 0
CONSOLABILITY: 0
TOTALSCORE: 0
CONSOLABILITY: 0
BREATHING: 0
NEGVOCALIZATION: 0
BODYLANGUAGE: 0
BREATHING: 0
BODYLANGUAGE: 0
BREATHING: 0
BODYLANGUAGE: 0
FACIALEXPRESSION: 0
CONSOLABILITY: 0
FACIALEXPRESSION: 0
BREATHING: 0
NEGVOCALIZATION: 0
TOTALSCORE: 0
BREATHING: 0
FACIALEXPRESSION: 0
TOTALSCORE: 0
BODYLANGUAGE: 0
FACIALEXPRESSION: 0
NEGVOCALIZATION: 0
NEGVOCALIZATION: 0
BODYLANGUAGE: 0
NEGVOCALIZATION: 0

## 2020-09-29 ASSESSMENT — PAIN SCALES - GENERAL
PAINLEVEL_OUTOF10: 0
PAINLEVEL_OUTOF10: 0

## 2020-09-29 NOTE — CARE COORDINATION
Pt is going home with family and [address] per Hospice nurse. They are working on getting the DME delivered to pt's home. Pt will be discharged when Hospice has everything set up for pt at home.   TE

## 2020-09-29 NOTE — CONSULTS
[facility] Consult Note    Date: 9/29/2020  Name: Jose Martin Keating  MRN: [id number]  YOB: 1929   Patient's PCP: Margaret Carcamo MD   Consultants during acute care: Urology, Neurology, Cardiology, Palliative Medicine  Referring Physician: Dr. Reid Cool care admission date: 9/25/2020     Informant: Chart reviewed, discussed with the patient's nurse and ELIER Nassar RN. I examined the patient and met with the patient, daughter, Bg Nur, and grandson, Brian Talley, at the bedside. CC: stroke, slow heart rate    [place]: This is a 80 y.o. right handed male admitted on 9/25/2020 with altered mental status, decreased alertness, and decreased intake. There was concern for left facial droop. In the ED, the patient had a stroke alert with [facility] without recommendation for thrombolytics. The patient also had a BS in the 50's, and has had intermittent bradycardia to the 30's. Work up shows atrial fibrillation on the EKG, normal TSH. Holter monitor is pending, and a pacemaker was briefly discussed with request to be conservative. Cerebral imaging shows CVA in left cerebral peduncle and mid brain, with cerebrovascular disease and prior strokes in the bilateral basal ganglia. The patient has been seen by Neurology and has had a swallowing evaluation. The patient had a goodson placed with some hematuria that is now clearing. The patient is alert, hard of hearing, and is being fed breakfast and seems to have a good appetite and no coughing or choking. He is using the utensils himself, and drinking coffee. The patient's daughter reports that the patient and spouse have a grandson that has been staying there for the past 1.5 years, but works. The patient's spouse has had falls and slow decline. They have assistance from the senior center (possibly once a month for housekeeping?). The patient did have home PT earlier in the year.       The patient was seen by Dr. Mario Mckay and hospice was asked to evaluate. The hospice nurse liaison met with the patient's spouse and daughter on 20, and the patient was talking some to the daughter. Hospice philosophy was discussed regarding care and comfort at the end of life. Questions were answered, and emotional support was provided. They are aware that Hospice does not provide for the patients 24 hour care giving needs. The patient is DNR-arrest status. Past Medical History:   Diagnosis Date    Dementia without behavioral disturbance ([facility] [address] 2019    Diabetes ([facility]        History reviewed. No pertinent surgical history.     Social History     Socioeconomic History    Marital status:      Spouse name: Not on file    Number of children: Not on file    Years of education: Not on file    Highest education level: Not on file   Occupational History    Occupation: retired [employer]   Social Needs    Financial resource strain: Not on file    Food insecurity     Worry: Not on file     Inability: Not on file   [employer] needs     Medical: Not on file     Non-medical: Not on file   Tobacco Use    Smoking status: Former Smoker     Last attempt to quit: 10/17/1987     Years since quittin.9    Smokeless tobacco: Never Used    Tobacco comment: quit over 25 years ago    Substance and Sexual Activity    Alcohol use: Yes     Comment: mixed drink before dinner    Drug use: No    Sexual activity: Not on file   Lifestyle    Physical activity     Days per week: Not on file     Minutes per session: Not on file    Stress: Not on file   Relationships    Social connections     Talks on phone: Not on file     Gets together: Not on file     Attends [employer] service: Not on file     Active member of club or organization: Not on file     Attends meetings of clubs or organizations: Not on file     Relationship status: Not on file    Intimate partner violence     Fear of current or ex partner: Not on file     Emotionally abused: Not on file Physically abused: Not on file     Forced sexual activity: Not on file   Other Topics Concern    Not on file   Social History Narrative    Not on file       Family History   Problem Relation Age of Onset    Cancer Sister     Diabetes Brother        No Known Allergies    Medication list reviewed  Prior to Admission medications    Medication Sig Start Date End Date Taking? Authorizing Provider   oxybutynin (DITROPAN-XL) 10 MG extended release tablet TAKE 1 TABLET BY MOUTH EVERY DAY 9/2/20   Petrona Ruiz MD   metFORMIN (GLUCOPHAGE) 500 MG tablet Take 1 tablet by mouth daily Take with largest meal of the day 7/16/20   Kendra Jones MD   Compression Stockings MISC by Does not apply route Use daily for treatment of leg edema 7/16/20   Kendra Jones MD   Petrolatum-Zinc Oxide (PHYTOPLEX Z-GUARD EX) Apply topically    Historical MD Se   blood glucose monitor kit and supplies Test 1 times a day & as needed for symptoms of irregular blood glucose. 5/12/20   Kendra Jones MD   blood glucose monitor strips Test 1 times a day & as needed for symptoms of irregular blood glucose.  5/12/20   Kendra Jones MD   Lancets MISC 1 each by Does not apply route daily 5/12/20   Kendra Jones MD       ROS: As noted in [address], all other systems are limited due to the patient's clinical condition but reviewed as able with staff and family, and are negative or as follows:  Constitutional: generally weak, no fever, chills, weight loss  HEENT:  No acute visual changes, nasal drainage, decreased hearing  CV:  No chest pain, palpitations  PULM:  No cough, shortness of breath, hemoptysis  GI:  No nausea, vomiting, diarrhea, hematemesis, melena, hematochezia  :  Mcclain with catheter trauma now clearing  Musculoskeletal:  Generally weak with occasional falls  Neuro: was lethargic and slow to respond, and began to improve on 9/28/20  Skin:  No rash    Weight:    Wt Readings from Last 3 Encounters:   09/28/20 199 lb 8.3 oz (90.5 kg)   05/12/20 197 lb 6.4 oz (89.5 kg)   07/09/19 200 lb (90.7 kg)       Data reviewed 9/29/2020:  MRI Brain 9/26/20:  1. Motion limited evaluation. 2. Tiny acute ischemic infarct in the left cerebral peduncle and anterior mid brain. 3. No hemorrhage or mass effect. 4. Diffuse parenchymal volume loss with moderate chronic white matter microvascular ischemic changes and chronic lacunar infarcts in the bilateral basal ganglia. CTA 9/25/20:  1. No large vessel occlusion of the intracranial arteries. 2. 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 3. 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 4. 40% stenosis of the proximal internal carotid arteries. 5. No significant left vertebral artery stenosis.      TSH: 1.68  Hemoglobin A1C 9/26/20: 8.4%  Accuchecks:   Recent Labs     09/28/20  1150 09/28/20  1700 09/29/20  0815   POCGLU 115* 116* 129*     Hepatic Function Panel:    Lab Results   Component Value Date    ALKPHOS 70 09/27/2020    ALT 10 09/27/2020    AST 14 09/27/2020    PROT 6.3 09/27/2020    BILITOT 0.8 09/27/2020    BILIDIR 0.2 09/26/2020    IBILI 0.5 09/26/2020    LABALBU 4.0 09/27/2020     CBC with Differential:    Lab Results   Component Value Date    WBC 7.5 09/26/2020    RBC 4.92 09/26/2020    HGB 14.9 09/26/2020    HCT 47.6 09/26/2020     09/26/2020    MCV 96.7 09/26/2020    MCH 30.3 09/26/2020    MCHC 31.3 09/26/2020    RDW 12.7 09/26/2020    SEGSPCT 57.0 09/25/2020    LYMPHOPCT 29.1 09/25/2020    MONOPCT 9.8 09/25/2020    BASOPCT 0.3 09/25/2020    MONOSABS 0.6 09/25/2020    LYMPHSABS 1.8 09/25/2020    EOSABS 0.2 09/25/2020    BASOSABS 0.0 09/25/2020    DIFFTYPE AUTOMATED DIFFERENTIAL 09/25/2020     BMP:   Recent Labs     09/27/20  0406      K 3.8      CO2 24   BUN 14   CREATININE 1.0   GLUCOSE 146*       Physical Exam:   BP (!) 176/84   Pulse (!) 49   Temp 98.2 °F (36.8 °C) (Axillary)   Resp 20   Ht 5' 7\" (1.702 m)   Wt 199 lb 8.3 oz (90.5 kg)   SpO2 94%   BMI 31.25 kg/m²   General: Comfortable, alert, sitting up in bed, in no distress, no coughing noted with eating  HEENT: Mucous membranes are moist, sclerae are clear   Neck: carotid upstrokes are diminished without bruit, no thyromegaly  Heart: slow IRRR, S1S2, no murmurs  Lungs:  Equal breath sounds bilaterally, diminished at the bases, without rales, rhonchi   Abdomen: soft, bowel sounds present, no tenderness, nondistended  Extremities:  No mottling or edema  Neurologic: alert, follows commands, I do not see any facial droop, hearing is decreased, he moves extremities spontaneously, gait is not assessed, there is some right upper extremity apraxia    Assessment/Plan:  1. Cerebrovascular disease with left cerebral peduncle infarct and prior CVA's. The patient is improved. We discussed trial of therapy. The family does not want Nursing Home due to [place], etc. The family is going to discuss, and we can follow up. I do believe that the patient is Hospice eligible, now or in the near future, and would be consistent with their goals of care. 2. Type 2 diabetes mellitus, with hypoglycemia on admission. He was only on Metformin once daily prior to admission  3. Bradycardia  4.  Atrial fibrillation, the patient would be high risk for anticoagulation       Patient Active Problem List   Diagnosis Code    Persistent atrial fibrillation I48.19    Abnormal EKG R94.31    Dementia without behavioral disturbance (HCC) F03.90    Loose stools R19.5    Stroke-like episode I63.9    Altered mental status R41.82    Essential hypertension I10    Type 2 diabetes mellitus without complication ([facility] [address] G00.5         Certification of Terminal Illness: I certify that this patient is eligible for Hospice services for a terminal diagnosis of cerebrovascular disease with a life expectancy predicted to be less than 6 months if this illness follows its expected course.       Janna Kang MD, [facility]

## 2020-09-29 NOTE — PROGRESS NOTES
[facility] [address]   Progress Note  [facility] 0 1 2      Date: 2020   Patient: Layman Gaucher   : 1929   DOA: 2020   MRN: [id number]   ROOM#: [id number]     Admit Date: 2020     Collaborating Urologist on Call at time of admission: Dr. Elle Pino  CC: Altered mental status and CVA  (poor historian)  Reason for Consult:  Acute Urinary Retention and Acute gross hematuria secondary to [facility] and goodson catheter trauma    Subjective:     Pain: none, no nausea and no vomiting,   Bowel Movement/Flatus:   Yes  Voiding: goodson catheter in place draining clear yellow urine     Pt resting comfortably in bed, denies any pain. CT pelvis today reveals goodson catheter and balloon inflated in correct position within bladder.     Objective:    Vitals:    BP (!) 176/84   Pulse (!) 49   Temp 98.2 °F (36.8 °C) (Axillary)   Resp 20   Ht 5' 7\" (1.702 m)   Wt 199 lb 8.3 oz (90.5 kg)   SpO2 96%   BMI 31.25 kg/m²    Temp  Av.2 °F (36.2 °C)  Min: 96.5 °F (35.8 °C)  Max: 98.2 °F (36.8 °C)       Intake/Output Summary (Last 24 hours) at 2020 1108  Last data filed at 2020 0641  Gross per 24 hour   Intake --   Output 700 ml   Net -700 ml       Physical Exam:   General appearance: alert, appears stated age, cooperative, no distress and slowed mentation  Head: Normocephalic, without obvious abnormality, atraumatic  Back: No CVA tenderness  Abdomen: Soft, non-tender, non-distended   : Goodson catheter in place draining clear yellow urine    Labs:   WBC:    Lab Results   Component Value Date    WBC 7.5 2020      Hemoglobin/Hematocrit:    Lab Results   Component Value Date    HGB 14.9 2020    HCT 47.6 2020      BMP:   Lab Results   Component Value Date     2020    K 3.8 2020     2020    CO2 24 2020    BUN 14 2020    LABALBU 4.0 2020    CREATININE 1.0 2020    CALCIUM 9.5 2020    GFRAA >60 2020    LABGLOM >60 09/27/2020      PT/INR:    Lab Results   Component Value Date    PROTIME 11.9 09/25/2020    INR 0.98 09/25/2020       Imaging:  Ct Head Wo Contrast    Result Date: 9/26/2020  EXAMINATION: CT OF THE HEAD WITHOUT CONTRAST  9/26/2020 6:23 pm TECHNIQUE: CT of the head was performed without the administration of intravenous contrast. Dose modulation, iterative reconstruction, and/or weight based adjustment of the mA/kV was utilized to reduce the radiation dose to as low as reasonably achievable. COMPARISON: CT head and CTA head 09/25/2020. Brain MRI 09/26/2020. HISTORY: ORDERING SYSTEM PROVIDED HISTORY: r/o bleed/cva TECHNOLOGIST PROVIDED HISTORY: Has a \"code stroke\" or \"stroke alert\" been called? ->No Reason for exam:->r/o bleed/cva Reason for Exam: r/o bleed/cva Acuity: Acute Type of Exam: Initial Additional signs and symptoms: none Relevant Medical/Surgical History: poor historian FINDINGS: BRAIN/VENTRICLES: There is no acute intracranial hemorrhage, mass effect or midline shift. No abnormal extra-axial fluid collection. The gray-white differentiation is maintained without evidence of an acute infarct. There is prominence of the ventricles and sulci due to global parenchymal volume loss. There are nonspecific areas of hypoattenuation within the periventricular and subcortical white matter, which likely represent chronic microvascular ischemic change. Chronic lacunar infarcts bilateral basal ganglia redemonstrated. Tiny focus of acute ischemia involving left cerebral peduncle on brain MRI earlier today is not evident on the CT exam. ORBITS: The visualized portion of the orbits demonstrate no acute abnormality. SINUSES: The visualized paranasal sinuses and mastoid air cells demonstrate no acute abnormality. SOFT TISSUES/SKULL: No acute abnormality of the visualized skull or soft tissues. Stable exam without CT evidence for acute intracranial abnormality.  Tiny focus of acute ischemia involving left cerebral are present within the urinary bladder. The bladder wall is thickened. Prostate gland enlargement. Xr Chest Portable    Result Date: 9/25/2020  EXAMINATION: ONE XRAY VIEW OF THE CHEST 9/25/2020 7:52 pm COMPARISON: None. HISTORY: ORDERING SYSTEM PROVIDED HISTORY: cva TECHNOLOGIST PROVIDED HISTORY: Reason for exam:->cva Reason for Exam: CVA Acuity: Acute Type of Exam: Initial Additional signs and symptoms: unknown Relevant Medical/Surgical History: none FINDINGS: Cardiac silhouette is enlarged. Atherosclerotic changes of the aorta. Chronic interstitial changes of the lungs. No focal consolidation, pleural effusion, or pneumothorax identified. Osseous structures appear intact. 1. Cardiomegaly. 2. No focal consolidation identified. Cta Neck W Contrast    Result Date: 9/25/2020  EXAMINATION: CTA OF THE HEAD WITH CONTRAST; CTA OF THE NECK 9/25/2020 7:25 pm: TECHNIQUE: CTA of the head/brain was performed with the administration of intravenous contrast. Multiplanar reformatted images are provided for review. MIP images are provided for review. Dose modulation, iterative reconstruction, and/or weight based adjustment of the mA/kV was utilized to reduce the radiation dose to as low as reasonably achievable.; CTA of the neck was performed with the administration of intravenous contrast. Multiplanar reformatted images are provided for review. MIP images are provided for review. Stenosis of the internal carotid arteries measured using NASCET criteria. Dose modulation, iterative reconstruction, and/or weight based adjustment of the mA/kV was utilized to reduce the radiation dose to as low as reasonably achievable. COMPARISON: None. HISTORY: ORDERING SYSTEM PROVIDED HISTORY: cva TECHNOLOGIST PROVIDED HISTORY: Has a \"code stroke\" or \"stroke alert\" been called? ->Yes Reason for exam:->cva Reason for Exam: cva Acuity: Acute Type of Exam: Initial FINDINGS: CTA NECK: AORTIC ARCH/ARCH VESSELS: Mild atherosclerotic plaque at the aortic arch without aneurysm or dissection. Conventional 3 vessel arch anatomy. Mild atherosclerotic plaque involving the innominate and subclavian arteries without stenosis. CAROTID ARTERIES: The common carotid arteries are normal in caliber. Atherosclerotic plaque at the carotid bifurcations and bulbs causes 40% stenosis of the proximal internal carotid arteries. The external carotid arteries are patent. No dissection. VERTEBRAL ARTERIES: The left vertebral artery is normal in caliber. The right vertebral artery is hypoplastic with diminishing contrast enhancement along its length through the V2/V3 junction with no contrast seen in the V3 segment. SOFT TISSUES: The lung apices are clear. No cervical or superior mediastinal lymphadenopathy. The larynx and pharynx are unremarkable. No acute abnormality of the salivary and thyroid glands. BONES: There is no acute fracture or suspect osseous lesion. Mild C5-6 and C6-7 degenerative disc disease is noted. CTA HEAD: ANTERIOR CIRCULATION: Calcification of the cavernous internal carotid arteries without significant stenosis. Anterior cerebral arteries are normal in caliber with hypoplasia of the right A1 segment. There is severe stenosis of bilateral right middle cerebral artery anterior M2 segments. POSTERIOR CIRCULATION: No contrast in the right vertebral artery. Left vertebral and basilar arteries are normal in caliber. There is severe focal stenosis of the P2 segment left posterior cerebral artery and moderate focal stenosis of the P2 segment right posterior cerebral artery. OTHER: No dural venous sinus thrombosis on this non-dedicated study. BRAIN: No mass effect or midline shift. No extra-axial fluid collection. The gray-white differentiation is maintained. 1. No large vessel occlusion of the intracranial arteries. 2. 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  3. 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 4. 40% stenosis of the proximal internal carotid arteries. 5. No significant left vertebral artery stenosis. Cta Head W Contrast    Result Date: 9/25/2020  EXAMINATION: CTA OF THE HEAD WITH CONTRAST; CTA OF THE NECK 9/25/2020 7:25 pm: TECHNIQUE: CTA of the head/brain was performed with the administration of intravenous contrast. Multiplanar reformatted images are provided for review. MIP images are provided for review. Dose modulation, iterative reconstruction, and/or weight based adjustment of the mA/kV was utilized to reduce the radiation dose to as low as reasonably achievable.; CTA of the neck was performed with the administration of intravenous contrast. Multiplanar reformatted images are provided for review. MIP images are provided for review. Stenosis of the internal carotid arteries measured using NASCET criteria. Dose modulation, iterative reconstruction, and/or weight based adjustment of the mA/kV was utilized to reduce the radiation dose to as low as reasonably achievable. COMPARISON: None. HISTORY: ORDERING SYSTEM PROVIDED HISTORY: cva TECHNOLOGIST PROVIDED HISTORY: Has a \"code stroke\" or \"stroke alert\" been called? ->Yes Reason for exam:->cva Reason for Exam: cva Acuity: Acute Type of Exam: Initial FINDINGS: CTA NECK: AORTIC ARCH/ARCH VESSELS: Mild atherosclerotic plaque at the aortic arch without aneurysm or dissection. Conventional 3 vessel arch anatomy. Mild atherosclerotic plaque involving the innominate and subclavian arteries without stenosis. CAROTID ARTERIES: The common carotid arteries are normal in caliber. Atherosclerotic plaque at the carotid bifurcations and bulbs causes 40% stenosis of the proximal internal carotid arteries. The external carotid arteries are patent. No dissection. VERTEBRAL ARTERIES: The left vertebral artery is normal in caliber.   The right vertebral artery is hypoplastic with diminishing contrast enhancement along its length through the V2/V3 junction with no contrast seen in the V3 segment. SOFT TISSUES: The lung apices are clear. No cervical or superior mediastinal lymphadenopathy. The larynx and pharynx are unremarkable. No acute abnormality of the salivary and thyroid glands. BONES: There is no acute fracture or suspect osseous lesion. Mild C5-6 and C6-7 degenerative disc disease is noted. CTA HEAD: ANTERIOR CIRCULATION: Calcification of the cavernous internal carotid arteries without significant stenosis. Anterior cerebral arteries are normal in caliber with hypoplasia of the right A1 segment. There is severe stenosis of bilateral right middle cerebral artery anterior M2 segments. POSTERIOR CIRCULATION: No contrast in the right vertebral artery. Left vertebral and basilar arteries are normal in caliber. There is severe focal stenosis of the P2 segment left posterior cerebral artery and moderate focal stenosis of the P2 segment right posterior cerebral artery. OTHER: No dural venous sinus thrombosis on this non-dedicated study. BRAIN: No mass effect or midline shift. No extra-axial fluid collection. The gray-white differentiation is maintained. 1. No large vessel occlusion of the intracranial arteries. 2. 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 3. 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 4. 40% stenosis of the proximal internal carotid arteries. 5. No significant left vertebral artery stenosis. Mri Brain Without Contrast    Result Date: 9/26/2020  EXAMINATION: MRI OF THE BRAIN WITHOUT CONTRAST  9/26/2020 3:08 pm TECHNIQUE: Multiplanar multisequence MRI of the brain was performed without the administration of intravenous contrast. COMPARISON: None.  HISTORY: ORDERING SYSTEM PROVIDED HISTORY: facial drooping TECHNOLOGIST PROVIDED HISTORY: Reason for exam:->facial drooping catheter for now              Recommend voiding trial prior to discharge, sooner if mental status improves. If pt fails voiding trial, recommend reinserting goodson catheter and following up in our office. Hospice consulted on pt. If family desires, goodson catheter may remain in place for pt comfort. If they decide to proceed with indwelling goodson catheter, we recommend goodson exchange every month. This may be done by home health or in our office.     Pt stable from a  standpoint. Will sign off, please call with any questions. Patient seen and examined, chart reviewed.      Electronically signed by Pratibha Mac PA-C on 9/29/2020 at 11:08 AM

## 2020-09-29 NOTE — PROGRESS NOTES
Pt's BP was 177/75 and 185/89. Marcie Kareem is ordered but too low for parameters to be given. Messaged Edwin Craft NP. She stated since he was already having stroke-like symptoms we will continue to monitor for now.

## 2020-09-29 NOTE — DISCHARGE SUMMARY
Discharge Summary Note  Patient ID:  Rizwan Pickard  [id number]  61 y.o.  8/18/1929    Admit date: 9/25/2020    Discharge date and time: No discharge date for patient encounter.      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 -CT head: Multifocal probably remote lacunar strokes  Continue aspirin, Plavix and statin  Keppra discontinued by neurology  Continue Lipitor  Patient is being discharged to home with hospice  Palliative care recommendations appreciated  Neurology recommendations appreciated    Gross hematuria-resolved  Restart aspirin Plavix  Okay with continuing Mcclain catheter  To follow urology as an outpatient  Urology recommendations appreciated    Hypoglycemia-Resolved  Secondary to poor oral intake    Bradycardia-Resolved  Chronic AFIB  Family declined pacemaker  A. 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  No anticoagulation due to high bleeding risk    HTN  Continue home meds     Patient is being discharged to home with hospice, Code status changed to DNR CC, as per palliative care discussion    Admission Condition: poor    Discharged Condition: stable    Consults: cardiology, palliative care, Neurology and hospiice    Significant Diagnostic Studies:   CBC with Differential:    Lab Results   Component Value Date    WBC 7.5 09/26/2020    RBC 4.92 09/26/2020    HGB 14.9 09/26/2020    HCT 47.6 09/26/2020     09/26/2020    MCV 96.7 09/26/2020    MCH 30.3 09/26/2020    MCHC 31.3 09/26/2020    RDW 12.7 09/26/2020    SEGSPCT 57.0 09/25/2020    LYMPHOPCT 29.1 09/25/2020    MONOPCT 9.8 09/25/2020    BASOPCT 0.3 09/25/2020    MONOSABS 0.6 09/25/2020    LYMPHSABS 1.8 09/25/2020    EOSABS 0.2 09/25/2020    BASOSABS 0.0 09/25/2020    DIFFTYPE AUTOMATED DIFFERENTIAL 09/25/2020     CMP:    Lab Results   Component Value Date     09/27/2020    K 3.8 09/27/2020     09/27/2020    CO2 24 09/27/2020    BUN 14 09/27/2020    CREATININE 1.0 09/27/2020    GFRAA >60 09/27/2020    AGRATIO 2.2 10/23/2019    LABGLOM >60 09/27/2020    GLUCOSE 146 09/27/2020    PROT 6.3 09/27/2020    LABALBU 4.0 09/27/2020    CALCIUM 9.5 09/27/2020    BILITOT 0.8 09/27/2020    ALKPHOS 70 09/27/2020    AST 14 09/27/2020    ALT 10 09/27/2020     Ct Head Wo Contrast    Result Date: 9/26/2020  EXAMINATION: CT OF THE HEAD WITHOUT CONTRAST  9/26/2020 6:23 pm TECHNIQUE: CT of the head was performed without the administration of intravenous contrast. Dose modulation, iterative reconstruction, and/or weight based adjustment of the mA/kV was utilized to reduce the radiation dose to as low as reasonably achievable. COMPARISON: CT head and CTA head 09/25/2020. Brain MRI 09/26/2020. HISTORY: ORDERING SYSTEM PROVIDED HISTORY: r/o bleed/cva TECHNOLOGIST PROVIDED HISTORY: Has a \"code stroke\" or \"stroke alert\" been called? ->No Reason for exam:->r/o bleed/cva Reason for Exam: r/o bleed/cva Acuity: Acute Type of Exam: Initial Additional signs and symptoms: none Relevant Medical/Surgical History: poor historian FINDINGS: BRAIN/VENTRICLES: There is no acute intracranial hemorrhage, mass effect or midline shift. No abnormal extra-axial fluid collection. The gray-white differentiation is maintained without evidence of an acute infarct. There is prominence of the ventricles and sulci due to global parenchymal volume loss.  There are nonspecific areas of hypoattenuation within the periventricular and subcortical white matter, which likely represent chronic microvascular ischemic change. Chronic lacunar infarcts bilateral basal ganglia redemonstrated. Tiny focus of acute ischemia involving left cerebral peduncle on brain MRI earlier today is not evident on the CT exam. ORBITS: The visualized portion of the orbits demonstrate no acute abnormality. SINUSES: The visualized paranasal sinuses and mastoid air cells demonstrate no acute abnormality. SOFT TISSUES/SKULL: No acute abnormality of the visualized skull or soft tissues. Stable exam without CT evidence for acute intracranial abnormality. Tiny focus of acute ischemia involving left cerebral peduncle on brain MRI earlier today is not evident on this CT exam.  Reference can be made to the MRI for additional information. Ct Head Wo Contrast    Result Date: 9/25/2020  EXAMINATION: CT OF THE HEAD WITHOUT CONTRAST  9/25/2020 7:21 pm TECHNIQUE: CT of the head was performed without the administration of intravenous contrast. Dose modulation, iterative reconstruction, and/or weight based adjustment of the mA/kV was utilized to reduce the radiation dose to as low as reasonably achievable. COMPARISON: None. HISTORY: ORDERING SYSTEM PROVIDED HISTORY: CVA TECHNOLOGIST PROVIDED HISTORY: Has a \"code stroke\" or \"stroke alert\" been called?-> yes Reason for exam:-> CVA Reason for Exam: CVA Acuity: Acute Type of Exam: Initial FINDINGS: BRAIN/VENTRICLES: There is no acute intracranial hemorrhage, mass effect or midline shift. No abnormal extra-axial fluid collection. The gray-white differentiation is maintained without evidence of an acute infarct. There is prominence of the ventricles and sulci due to global parenchymal volume loss. There are nonspecific areas of hypoattenuation within the periventricular and subcortical white matter, which likely represent chronic microvascular ischemic change. Multifocal probably remote lacunar stroke involving the bilateral basal ganglia and left centrum semiovale. ORBITS: The visualized portion of the orbits demonstrate no acute abnormality. SINUSES: The visualized paranasal sinuses and mastoid air cells demonstrate no acute abnormality. SOFT TISSUES/SKULL: No acute abnormality of the visualized skull or soft tissues. No acute intracranial abnormality. Multifocal probably remote lacunar stroke involving the bilateral basal ganglia and left centrum semiovale. Senescent changes. Per stroke alert protocol, results were called by Dr. Rebecca Soliz to   Reilly Red  on 9/25/2020 at 19:31. Xr Chest Portable    Result Date: 9/25/2020  EXAMINATION: ONE XRAY VIEW OF THE CHEST 9/25/2020 7:52 pm COMPARISON: None. HISTORY: ORDERING SYSTEM PROVIDED HISTORY: cva TECHNOLOGIST PROVIDED HISTORY: Reason for exam:->cva Reason for Exam: CVA Acuity: Acute Type of Exam: Initial Additional signs and symptoms: unknown Relevant Medical/Surgical History: none FINDINGS: Cardiac silhouette is enlarged. Atherosclerotic changes of the aorta. Chronic interstitial changes of the lungs. No focal consolidation, pleural effusion, or pneumothorax identified. Osseous structures appear intact. 1. Cardiomegaly. 2. No focal consolidation identified. Cta Neck W Contrast    Result Date: 9/25/2020  EXAMINATION: CTA OF THE HEAD WITH CONTRAST; CTA OF THE NECK 9/25/2020 7:25 pm: TECHNIQUE: CTA of the head/brain was performed with the administration of intravenous contrast. Multiplanar reformatted images are provided for review. MIP images are provided for review. Dose modulation, iterative reconstruction, and/or weight based adjustment of the mA/kV was utilized to reduce the radiation dose to as low as reasonably achievable.; CTA of the neck was performed with the administration of intravenous contrast. Multiplanar reformatted images are provided for review. MIP images are provided for review. Stenosis of the internal carotid arteries measured using NASCET criteria.  Dose modulation, iterative reconstruction, and/or weight based adjustment of the mA/kV was utilized to reduce the radiation dose to as low as reasonably achievable. COMPARISON: None. HISTORY: ORDERING SYSTEM PROVIDED HISTORY: cva TECHNOLOGIST PROVIDED HISTORY: Has a \"code stroke\" or \"stroke alert\" been called? ->Yes Reason for exam:->cva Reason for Exam: cva Acuity: Acute Type of Exam: Initial FINDINGS: CTA NECK: AORTIC ARCH/ARCH VESSELS: Mild atherosclerotic plaque at the aortic arch without aneurysm or dissection. Conventional 3 vessel arch anatomy. Mild atherosclerotic plaque involving the innominate and subclavian arteries without stenosis. CAROTID ARTERIES: The common carotid arteries are normal in caliber. Atherosclerotic plaque at the carotid bifurcations and bulbs causes 40% stenosis of the proximal internal carotid arteries. The external carotid arteries are patent. No dissection. VERTEBRAL ARTERIES: The left vertebral artery is normal in caliber. The right vertebral artery is hypoplastic with diminishing contrast enhancement along its length through the V2/V3 junction with no contrast seen in the V3 segment. SOFT TISSUES: The lung apices are clear. No cervical or superior mediastinal lymphadenopathy. The larynx and pharynx are unremarkable. No acute abnormality of the salivary and thyroid glands. BONES: There is no acute fracture or suspect osseous lesion. Mild C5-6 and C6-7 degenerative disc disease is noted. CTA HEAD: ANTERIOR CIRCULATION: Calcification of the cavernous internal carotid arteries without significant stenosis. Anterior cerebral arteries are normal in caliber with hypoplasia of the right A1 segment. There is severe stenosis of bilateral right middle cerebral artery anterior M2 segments. POSTERIOR CIRCULATION: No contrast in the right vertebral artery. Left vertebral and basilar arteries are normal in caliber.   There is severe focal stenosis of the P2 segment left posterior cerebral artery and moderate focal stenosis of the P2 segment right posterior cerebral artery. OTHER: No dural venous sinus thrombosis on this non-dedicated study. BRAIN: No mass effect or midline shift. No extra-axial fluid collection. The gray-white differentiation is maintained. 1. No large vessel occlusion of the intracranial arteries. 2. 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 3. 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 4. 40% stenosis of the proximal internal carotid arteries. 5. No significant left vertebral artery stenosis. Cta Head W Contrast    Result Date: 9/25/2020  EXAMINATION: CTA OF THE HEAD WITH CONTRAST; CTA OF THE NECK 9/25/2020 7:25 pm: TECHNIQUE: CTA of the head/brain was performed with the administration of intravenous contrast. Multiplanar reformatted images are provided for review. MIP images are provided for review. Dose modulation, iterative reconstruction, and/or weight based adjustment of the mA/kV was utilized to reduce the radiation dose to as low as reasonably achievable.; CTA of the neck was performed with the administration of intravenous contrast. Multiplanar reformatted images are provided for review. MIP images are provided for review. Stenosis of the internal carotid arteries measured using NASCET criteria. Dose modulation, iterative reconstruction, and/or weight based adjustment of the mA/kV was utilized to reduce the radiation dose to as low as reasonably achievable. COMPARISON: None. HISTORY: ORDERING SYSTEM PROVIDED HISTORY: cva TECHNOLOGIST PROVIDED HISTORY: Has a \"code stroke\" or \"stroke alert\" been called? ->Yes Reason for exam:->cva Reason for Exam: cva Acuity: Acute Type of Exam: Initial FINDINGS: CTA NECK: AORTIC ARCH/ARCH VESSELS: Mild atherosclerotic plaque at the aortic arch without aneurysm or dissection. Conventional 3 vessel arch anatomy.   Mild atherosclerotic plaque involving the innominate and subclavian arteries without stenosis. CAROTID ARTERIES: The common carotid arteries are normal in caliber. Atherosclerotic plaque at the carotid bifurcations and bulbs causes 40% stenosis of the proximal internal carotid arteries. The external carotid arteries are patent. No dissection. VERTEBRAL ARTERIES: The left vertebral artery is normal in caliber. The right vertebral artery is hypoplastic with diminishing contrast enhancement along its length through the V2/V3 junction with no contrast seen in the V3 segment. SOFT TISSUES: The lung apices are clear. No cervical or superior mediastinal lymphadenopathy. The larynx and pharynx are unremarkable. No acute abnormality of the salivary and thyroid glands. BONES: There is no acute fracture or suspect osseous lesion. Mild C5-6 and C6-7 degenerative disc disease is noted. CTA HEAD: ANTERIOR CIRCULATION: Calcification of the cavernous internal carotid arteries without significant stenosis. Anterior cerebral arteries are normal in caliber with hypoplasia of the right A1 segment. There is severe stenosis of bilateral right middle cerebral artery anterior M2 segments. POSTERIOR CIRCULATION: No contrast in the right vertebral artery. Left vertebral and basilar arteries are normal in caliber. There is severe focal stenosis of the P2 segment left posterior cerebral artery and moderate focal stenosis of the P2 segment right posterior cerebral artery. OTHER: No dural venous sinus thrombosis on this non-dedicated study. BRAIN: No mass effect or midline shift. No extra-axial fluid collection. The gray-white differentiation is maintained. 1. No large vessel occlusion of the intracranial arteries. 2. 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 3. 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  4. 40% stenosis of the proximal internal carotid arteries. 5. No significant left vertebral artery stenosis. Mri Brain Without Contrast    Result Date: 9/26/2020  EXAMINATION: MRI OF THE BRAIN WITHOUT CONTRAST  9/26/2020 3:08 pm TECHNIQUE: Multiplanar multisequence MRI of the brain was performed without the administration of intravenous contrast. COMPARISON: None. HISTORY: ORDERING SYSTEM PROVIDED HISTORY: facial drooping TECHNOLOGIST PROVIDED HISTORY: Reason for exam:->facial drooping Reason for Exam: ams facial droop-pt unable to follow directions cough - repeated many scans-best images FINDINGS: Motion limited evaluation despite repeat imaging attempts. INTRACRANIAL STRUCTURES/VENTRICLES: Diffuse parenchymal volume loss with moderate chronic white matter microvascular ischemic changes and chronic lacunar infarcts in the bilateral basal ganglia and cerebellum. Punctate focus of diffusion restriction and T2/FLAIR hyperintensity in the left cerebral peduncle and left anterior midbrain. No intracranial mass. No mass effect or midline shift. No evidence of an acute intracranial hemorrhage. The ventricles and sulci are normal in size and configuration. The sellar/suprasellar regions appear unremarkable. The normal signal voids within the major intracranial vessels appear maintained. ORBITS: The visualized portion of the orbits demonstrate no acute abnormality. SINUSES: The visualized paranasal sinuses and mastoid air cells are well aerated. BONES/SOFT TISSUES: The bone marrow signal intensity appears normal. The soft tissues demonstrate no acute abnormality. 1. Motion limited evaluation. 2. Tiny acute ischemic infarct in the left cerebral peduncle and anterior mid brain. 3. No hemorrhage or mass effect. 4. Diffuse parenchymal volume loss with moderate chronic white matter microvascular ischemic changes and chronic lacunar infarcts in the bilateral basal ganglia.        Discharge Exam:  GEN    Awake male, sitting upright in bed in no apparent distress. Appears given age. EYES   Pupils are equally round. No scleral erythema, discharge, or conjunctivitis. HENT  Mucous membranes are moist. Oral pharynx without exudates, no evidence of thrush. NECK  Supple, no apparent thyromegaly or masses. RESP  Clear to auscultation, no wheezes, rales or rhonchi. Symmetric chest movement while on room air. CARDIO/VASC           S1/S2 auscultated. Regular rate without appreciable murmurs, rubs, or gallops. No JVD or carotid bruits. Peripheral pulses equal bilaterally and palpable. No peripheral edema. GI        Abdomen is soft without significant tenderness, masses, or guarding. Bowel sounds are normoactive. Rectal exam deferred.        No costovertebral angle tenderness. Normal appearing external genitalia. Mcclain catheter is not present. HEME/LYMPH            No palpable cervical lymphadenopathy and no hepatosplenomegaly. No petechiae or ecchymoses. MSK    No gross joint deformities. SKIN    Normal coloration, warm, dry. NEURO           Cranial nerves appear grossly intact, normal speech, no lateralizing weakness. PSYCH            Awake, alert, oriented x 1-2. Affect appropriate. Disposition: home    Patient Instructions:     Discharge Medications:   Vania Gavin   Home Medication Instructions XKY:[id number]    Printed on:09/29/20 1641   Medication Information                      aspirin 81 MG EC tablet  Take 1 tablet by mouth daily             atorvastatin (LIPITOR) 20 MG tablet  Take 1 tablet by mouth nightly             blood glucose monitor kit and supplies  Test 1 times a day & as needed for symptoms of irregular blood glucose. blood glucose monitor strips  Test 1 times a day & as needed for symptoms of irregular blood glucose.              clopidogrel (PLAVIX) 75 MG tablet  Take 1 tablet by mouth daily             Compression Stockings MISC  by Does not apply route Use daily for treatment of leg edema             Lancets MISC  1 each by Does not apply route daily             metFORMIN (GLUCOPHAGE) 500 MG tablet  Take 1 tablet by mouth daily Take with largest meal of the day             oxybutynin (DITROPAN-XL) 10 MG extended release tablet  TAKE 1 TABLET BY MOUTH EVERY DAY             Petrolatum-Zinc Oxide (PHYTOPLEX Z-GUARD EX)  Apply topically             tamsulosin (FLOMAX) 0.4 MG capsule  Take 1 capsule by mouth daily                 Activity: activity as tolerated    Diet: regular diet    Follow-up:  Hospice    Time Spent Doing discharge 33 min  Electronically signed by Zeeshan Duron MD  on 9/29/20 at 4:41 PM EDT

## 2020-09-30 VITALS
SYSTOLIC BLOOD PRESSURE: 152 MMHG | TEMPERATURE: 98 F | RESPIRATION RATE: 16 BRPM | HEIGHT: 67 IN | WEIGHT: 199.52 LBS | DIASTOLIC BLOOD PRESSURE: 82 MMHG | HEART RATE: 70 BPM | BODY MASS INDEX: 31.31 KG/M2 | OXYGEN SATURATION: 98 %

## 2020-09-30 LAB — GLUCOSE BLD-MCNC: 160 MG/DL (ref 70–99)

## 2020-09-30 PROCEDURE — 2580000003 HC RX 258: Performed by: HOSPITALIST

## 2020-09-30 PROCEDURE — 82962 GLUCOSE BLOOD TEST: CPT

## 2020-09-30 PROCEDURE — 94761 N-INVAS EAR/PLS OXIMETRY MLT: CPT

## 2020-09-30 RX ORDER — MORPHINE SULFATE 20 MG/ML
SOLUTION ORAL
Qty: 30 ML | Refills: 0 | Status: SHIPPED | OUTPATIENT
Start: 2020-09-30 | End: 2020-10-30

## 2020-09-30 RX ORDER — HALOPERIDOL 2 MG/ML
SOLUTION ORAL
Qty: 30 ML | Refills: 0 | Status: SHIPPED | OUTPATIENT
Start: 2020-09-30

## 2020-09-30 RX ORDER — LORAZEPAM 0.5 MG/1
TABLET ORAL
Qty: 30 TABLET | Refills: 0 | Status: SHIPPED | OUTPATIENT
Start: 2020-09-30 | End: 2020-10-30

## 2020-09-30 RX ADMIN — SODIUM CHLORIDE, PRESERVATIVE FREE 10 ML: 5 INJECTION INTRAVENOUS at 10:01

## 2020-09-30 RX ADMIN — INSULIN LISPRO 2 UNITS: 100 INJECTION, SOLUTION INTRAVENOUS; SUBCUTANEOUS at 10:24

## 2020-09-30 ASSESSMENT — PAIN SCALES - PAIN ASSESSMENT IN ADVANCED DEMENTIA (PAINAD)
FACIALEXPRESSION: 0
BREATHING: 0
CONSOLABILITY: 0
BODYLANGUAGE: 0
TOTALSCORE: 0
NEGVOCALIZATION: 0

## 2020-09-30 NOTE — PROGRESS NOTES
[address] [place] [address]    The patient was not discharged last night due to a concern about the goodson(?). The patient refused oral meds overnight, and was agitated but did not require any medications to manage. The patient was sleeping when I went in the room, awakened to voice and exam. No conversation this morning. He was not agitated for me. Discussed with the patient's nurse. I will leave Rx for prn Lorazepam, and also Roxanol and Haloperidol concentrate to have available in the home, but no active order. I would stop the Metformin given hypoglycemia on admission, poor oral intake and hospice care.     Marleni Dougherty MD

## 2020-09-30 NOTE — PROGRESS NOTES
NEUROLOGY NOTE  DR. Neno Salas MD.  -------------------------------------------------  Subjective:    Pt is more awake and following commands today    Pt and famiy denies any new symptoms    Pt not eating much    Pt is able to communicate some today    Denies any new symptoms    Interacting with pts family    Doing better. Denies any new symptoms. Denies headache nausea vomiting dizziness    Denies numbness weakness extremities    Denies blurring of vision double vision    Objective:    BP (!) 152/82   Pulse 66   Temp 98.8 °F (37.1 °C) (Oral)   Resp 16   Ht 5' 7\" (1.702 m)   Wt 199 lb 8.3 oz (90.5 kg)   SpO2 95%   BMI 31.25 kg/m²   HEENT nl      Neuro exam    Alert Oriented  X 2  Follow simple commands  Aphasic expressive   EOMI Pupils 3 mm thuy  5/5 all 4 extremities      RADIOLOGY  -----------------    Ct Head Wo Contrast    Result Date: 9/26/2020  EXAMINATION: CT OF THE HEAD WITHOUT CONTRAST  9/26/2020 6:23 pm TECHNIQUE: CT of the head was performed without the administration of intravenous contrast. Dose modulation, iterative reconstruction, and/or weight based adjustment of the mA/kV was utilized to reduce the radiation dose to as low as reasonably achievable. COMPARISON: CT head and CTA head 09/25/2020. Brain MRI 09/26/2020. HISTORY: ORDERING SYSTEM PROVIDED HISTORY: r/o bleed/cva TECHNOLOGIST PROVIDED HISTORY: Has a \"code stroke\" or \"stroke alert\" been called? ->No Reason for exam:->r/o bleed/cva Reason for Exam: r/o bleed/cva Acuity: Acute Type of Exam: Initial Additional signs and symptoms: none Relevant Medical/Surgical History: poor historian FINDINGS: BRAIN/VENTRICLES: There is no acute intracranial hemorrhage, mass effect or midline shift. No abnormal extra-axial fluid collection. The gray-white differentiation is maintained without evidence of an acute infarct. There is prominence of the ventricles and sulci due to global parenchymal volume loss.  There are nonspecific areas of lacunar stroke involving the bilateral basal ganglia and left centrum semiovale. ORBITS: The visualized portion of the orbits demonstrate no acute abnormality. SINUSES: The visualized paranasal sinuses and mastoid air cells demonstrate no acute abnormality. SOFT TISSUES/SKULL: No acute abnormality of the visualized skull or soft tissues. No acute intracranial abnormality. Multifocal probably remote lacunar stroke involving the bilateral basal ganglia and left centrum semiovale. Senescent changes. Per stroke alert protocol, results were called by Dr. Malina Dalal to   Reilly Red  on 9/25/2020 at 19:31. Xr Chest Portable    Result Date: 9/25/2020  EXAMINATION: ONE XRAY VIEW OF THE CHEST 9/25/2020 7:52 pm COMPARISON: None. HISTORY: ORDERING SYSTEM PROVIDED HISTORY: cva TECHNOLOGIST PROVIDED HISTORY: Reason for exam:->cva Reason for Exam: CVA Acuity: Acute Type of Exam: Initial Additional signs and symptoms: unknown Relevant Medical/Surgical History: none FINDINGS: Cardiac silhouette is enlarged. Atherosclerotic changes of the aorta. Chronic interstitial changes of the lungs. No focal consolidation, pleural effusion, or pneumothorax identified. Osseous structures appear intact. 1. Cardiomegaly. 2. No focal consolidation identified. Cta Neck W Contrast    Result Date: 9/25/2020  EXAMINATION: CTA OF THE HEAD WITH CONTRAST; CTA OF THE NECK 9/25/2020 7:25 pm: TECHNIQUE: CTA of the head/brain was performed with the administration of intravenous contrast. Multiplanar reformatted images are provided for review. MIP images are provided for review. Dose modulation, iterative reconstruction, and/or weight based adjustment of the mA/kV was utilized to reduce the radiation dose to as low as reasonably achievable.; CTA of the neck was performed with the administration of intravenous contrast. Multiplanar reformatted images are provided for review. MIP images are provided for review.  Stenosis of the internal stenosis of the P2 segment left posterior cerebral artery and moderate focal stenosis of the P2 segment right posterior cerebral artery. OTHER: No dural venous sinus thrombosis on this non-dedicated study. BRAIN: No mass effect or midline shift. No extra-axial fluid collection. The gray-white differentiation is maintained. 1. No large vessel occlusion of the intracranial arteries. 2. 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 3. 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 4. 40% stenosis of the proximal internal carotid arteries. 5. No significant left vertebral artery stenosis. Cta Head W Contrast    Result Date: 9/25/2020  EXAMINATION: CTA OF THE HEAD WITH CONTRAST; CTA OF THE NECK 9/25/2020 7:25 pm: TECHNIQUE: CTA of the head/brain was performed with the administration of intravenous contrast. Multiplanar reformatted images are provided for review. MIP images are provided for review. Dose modulation, iterative reconstruction, and/or weight based adjustment of the mA/kV was utilized to reduce the radiation dose to as low as reasonably achievable.; CTA of the neck was performed with the administration of intravenous contrast. Multiplanar reformatted images are provided for review. MIP images are provided for review. Stenosis of the internal carotid arteries measured using NASCET criteria. Dose modulation, iterative reconstruction, and/or weight based adjustment of the mA/kV was utilized to reduce the radiation dose to as low as reasonably achievable. COMPARISON: None. HISTORY: ORDERING SYSTEM PROVIDED HISTORY: cva TECHNOLOGIST PROVIDED HISTORY: Has a \"code stroke\" or \"stroke alert\" been called? ->Yes Reason for exam:->cva Reason for Exam: cva Acuity: Acute Type of Exam: Initial FINDINGS: CTA NECK: AORTIC ARCH/ARCH VESSELS: Mild atherosclerotic plaque at the aortic arch without aneurysm or dissection. moderate on the right. 4. 40% stenosis of the proximal internal carotid arteries. 5. No significant left vertebral artery stenosis. Mri Brain Without Contrast    Result Date: 9/26/2020  EXAMINATION: MRI OF THE BRAIN WITHOUT CONTRAST  9/26/2020 3:08 pm TECHNIQUE: Multiplanar multisequence MRI of the brain was performed without the administration of intravenous contrast. COMPARISON: None. HISTORY: ORDERING SYSTEM PROVIDED HISTORY: facial drooping TECHNOLOGIST PROVIDED HISTORY: Reason for exam:->facial drooping Reason for Exam: ams facial droop-pt unable to follow directions cough - repeated many scans-best images FINDINGS: Motion limited evaluation despite repeat imaging attempts. INTRACRANIAL STRUCTURES/VENTRICLES: Diffuse parenchymal volume loss with moderate chronic white matter microvascular ischemic changes and chronic lacunar infarcts in the bilateral basal ganglia and cerebellum. Punctate focus of diffusion restriction and T2/FLAIR hyperintensity in the left cerebral peduncle and left anterior midbrain. No intracranial mass. No mass effect or midline shift. No evidence of an acute intracranial hemorrhage. The ventricles and sulci are normal in size and configuration. The sellar/suprasellar regions appear unremarkable. The normal signal voids within the major intracranial vessels appear maintained. ORBITS: The visualized portion of the orbits demonstrate no acute abnormality. SINUSES: The visualized paranasal sinuses and mastoid air cells are well aerated. BONES/SOFT TISSUES: The bone marrow signal intensity appears normal. The soft tissues demonstrate no acute abnormality. 1. Motion limited evaluation. 2. Tiny acute ischemic infarct in the left cerebral peduncle and anterior mid brain. 3. No hemorrhage or mass effect. 4. Diffuse parenchymal volume loss with moderate chronic white matter microvascular ischemic changes and chronic lacunar infarcts in the bilateral basal ganglia.        LAB RESULTS  --------------------    Recent Results (from the past 24 hour(s))   SPECIMEN REJECTION    Collection Time: 09/29/20  4:08 AM   Result Value Ref Range    Rejected Test [facility]MCYS     Reason for Rejection UNABLE TO PERFORM TESTING:    POCT Glucose    Collection Time: 09/29/20  8:15 AM   Result Value Ref Range    POC Glucose 129 (H) 70 - 99 MG/DL   POCT Glucose    Collection Time: 09/29/20  1:14 PM   Result Value Ref Range    POC Glucose 178 (H) 70 - 99 MG/DL   Homocysteine, Serum    Collection Time: 09/29/20  3:23 PM   Result Value Ref Range    Homocysteine 11.8 (H) 0 - 10 umol/L         Medical problems    Patient Active Problem List:     Persistent atrial fibrillation     Abnormal EKG     Dementia without behavioral disturbance (HCC)     Loose stools     Stroke-like episode     Altered mental status      ASSESSMENT:  ---------------------    Left midbrain cerebral peduncle acute infarct    TIA/CVA     ? 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     Discussed plan of care with pts daughter. Pt is doing some better today and is communicating wih his family.         Electronically signed by Irma Rodríguez MD on 9/29/2020 at 8:40 PM

## 2020-09-30 NOTE — PROGRESS NOTES
CLINICAL PHARMACY NOTE: MEDS TO [address] [place] [address] Drive Select Patient?: No  Total # of Prescriptions Filled: 3   The following medications were delivered to the patient:  · Lorazepam 0.5mg  · Morphine sulfate 20mg/ml solution  · Haloperidol 2mg/ml  Total # of Interventions Completed: 0  Time Spent (min): 30    Additional Documentation:

## 2020-09-30 NOTE — PROGRESS NOTES
Pt has been agitated since beginning of shift. Pt repeatedly states he \"wants to go to bed\". Several times patient has attempted to climb out of bed. Bed alarm and  remain in place for safety. This RN unable to convince patient to take any po medication, including newly ordered ditropan.

## 2020-09-30 NOTE — PROGRESS NOTES
Report received from [facility]. Daughter Mabel Chu at bedside and to nurses station during report. Per report Bernie Espinoza states that goodson catheter has poor output possibly due to enlarged prostate as it is easy to flush catheter but with little to no fluid return. Bernie Espinoza states she has communicated this to [facility] with urology and he has ordered medication. [facility] is also at bedside to transport patient to home with plan for admission to hospice. Daughter Mabel Chu voices concern about taking patient home due the issue with urinary output. Bhargav reports per her communication with urology, discharge is to be held for further evaluation and symptom management. Daughter voices understanding and appreciation.

## 2020-09-30 NOTE — PROGRESS NOTES
PC from [facility] regarding pts grandson wanting a leave from [employer] due to grandfathers current health condition. Ok to answer all questions regarding pts condition per supervisor. Questions answered appropriately.

## 2020-09-30 NOTE — DISCHARGE SUMMARY
Discharge Summary Note  Patient ID:  Mariusz Hillman  [id number]  34 y.o.  8/18/1929    Admit date: 9/25/2020    Discharge date and time: No discharge date for patient encounter.      Admitting Physician: Johnathan Taveras MD     Discharge Physician: Bronwyn Hernandez MD    Admission Diagnoses:   Bradycardia [R00.1]  Hypoglycemia [E16.2]  Facial droop [R29.810]  Stroke-like episode [I63.9]  Altered mental status, unspecified altered mental status type [R41.82]    Discharge Diagnoses and Hospital Course:   CVA in left cerebral peduncle and anterior mid brain  Acute metabolic/ischemic encephalopathy  CTA head and neck: Right vertebral artery with occlusion; bilateral posterior cerebral artery stenosis; 40% stenosis of proximal ICA. -CT head: Multifocal probably remote lacunar strokes  Continue aspirin, Plavix and statin  Keppra discontinued by neurology  Continue Lipitor  Patient is being discharged to home with hospice  Palliative care recommendations appreciated  Neurology recommendations appreciated    Gross hematuria-resolved  Restart aspirin Plavix  Okay with continuing Mcclain catheter  To follow urology as an outpatient  Urology recommendations appreciated    Hypoglycemia-Resolved  Secondary to poor oral intake    Bradycardia-Resolved  Chronic AFIB  Family declined pacemaker  A. 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  No anticoagulation due to high bleeding risk    HTN  Continue home meds     Patient is being discharged to home with hospice, Code status changed to DNR CC, as per palliative care discussion    Admission Condition: poor    Discharged Condition: stable    Consults: cardiology, palliative care, Neurology and hospiice    Significant Diagnostic Studies:   CBC with Differential:    Lab Results   Component Value Date    WBC 7.5 09/26/2020    RBC 4.92 09/26/2020    HGB 14.9 09/26/2020    HCT 47.6 09/26/2020     09/26/2020    MCV 96.7 09/26/2020    MCH 30.3 09/26/2020    MCHC 31.3 09/26/2020    RDW 12.7 09/26/2020    SEGSPCT 57.0 09/25/2020    LYMPHOPCT 29.1 09/25/2020    MONOPCT 9.8 09/25/2020    BASOPCT 0.3 09/25/2020    MONOSABS 0.6 09/25/2020    LYMPHSABS 1.8 09/25/2020    EOSABS 0.2 09/25/2020    BASOSABS 0.0 09/25/2020    DIFFTYPE AUTOMATED DIFFERENTIAL 09/25/2020     CMP:    Lab Results   Component Value Date     09/27/2020    K 3.8 09/27/2020     09/27/2020    CO2 24 09/27/2020    BUN 14 09/27/2020    CREATININE 1.0 09/27/2020    GFRAA >60 09/27/2020    AGRATIO 2.2 10/23/2019    LABGLOM >60 09/27/2020    GLUCOSE 146 09/27/2020    PROT 6.3 09/27/2020    LABALBU 4.0 09/27/2020    CALCIUM 9.5 09/27/2020    BILITOT 0.8 09/27/2020    ALKPHOS 70 09/27/2020    AST 14 09/27/2020    ALT 10 09/27/2020     Ct Head Wo Contrast    Result Date: 9/26/2020  EXAMINATION: CT OF THE HEAD WITHOUT CONTRAST  9/26/2020 6:23 pm TECHNIQUE: CT of the head was performed without the administration of intravenous contrast. Dose modulation, iterative reconstruction, and/or weight based adjustment of the mA/kV was utilized to reduce the radiation dose to as low as reasonably achievable. COMPARISON: CT head and CTA head 09/25/2020. Brain MRI 09/26/2020. HISTORY: ORDERING SYSTEM PROVIDED HISTORY: r/o bleed/cva TECHNOLOGIST PROVIDED HISTORY: Has a \"code stroke\" or \"stroke alert\" been called? ->No Reason for exam:->r/o bleed/cva Reason for Exam: r/o bleed/cva Acuity: Acute Type of Exam: Initial Additional signs and symptoms: none Relevant Medical/Surgical History: poor historian FINDINGS: BRAIN/VENTRICLES: There is no acute intracranial hemorrhage, mass effect or midline shift. No abnormal extra-axial fluid collection. The gray-white differentiation is maintained without evidence of an acute infarct. There is prominence of the ventricles and sulci due to global parenchymal volume loss.  There are nonspecific areas of hypoattenuation within the periventricular and subcortical white matter, which likely ORBITS: The visualized portion of the orbits demonstrate no acute abnormality. SINUSES: The visualized paranasal sinuses and mastoid air cells demonstrate no acute abnormality. SOFT TISSUES/SKULL: No acute abnormality of the visualized skull or soft tissues. No acute intracranial abnormality. Multifocal probably remote lacunar stroke involving the bilateral basal ganglia and left centrum semiovale. Senescent changes. Per stroke alert protocol, results were called by Dr. Chiki Gray to   [address] on 9/25/2020 at 19:31. Xr Chest Portable    Result Date: 9/25/2020  EXAMINATION: ONE XRAY VIEW OF THE CHEST 9/25/2020 7:52 pm COMPARISON: None. HISTORY: ORDERING SYSTEM PROVIDED HISTORY: cva TECHNOLOGIST PROVIDED HISTORY: Reason for exam:->cva Reason for Exam: CVA Acuity: Acute Type of Exam: Initial Additional signs and symptoms: unknown Relevant Medical/Surgical History: none FINDINGS: Cardiac silhouette is enlarged. Atherosclerotic changes of the aorta. Chronic interstitial changes of the lungs. No focal consolidation, pleural effusion, or pneumothorax identified. Osseous structures appear intact. 1. Cardiomegaly. 2. No focal consolidation identified. Cta Neck W Contrast    Result Date: 9/25/2020  EXAMINATION: CTA OF THE HEAD WITH CONTRAST; CTA OF THE NECK 9/25/2020 7:25 pm: TECHNIQUE: CTA of the head/brain was performed with the administration of intravenous contrast. Multiplanar reformatted images are provided for review. MIP images are provided for review. Dose modulation, iterative reconstruction, and/or weight based adjustment of the mA/kV was utilized to reduce the radiation dose to as low as reasonably achievable.; CTA of the neck was performed with the administration of intravenous contrast. Multiplanar reformatted images are provided for review. MIP images are provided for review. Stenosis of the internal carotid arteries measured using NASCET criteria.  Dose modulation, iterative reconstruction, and/or weight based adjustment of the mA/kV was utilized to reduce the radiation dose to as low as reasonably achievable. COMPARISON: None. HISTORY: ORDERING SYSTEM PROVIDED HISTORY: cva TECHNOLOGIST PROVIDED HISTORY: Has a \"code stroke\" or \"stroke alert\" been called? ->Yes Reason for exam:->cva Reason for Exam: cva Acuity: Acute Type of Exam: Initial FINDINGS: CTA NECK: AORTIC ARCH/ARCH VESSELS: Mild atherosclerotic plaque at the aortic arch without aneurysm or dissection. Conventional 3 vessel arch anatomy. Mild atherosclerotic plaque involving the innominate and subclavian arteries without stenosis. CAROTID ARTERIES: The common carotid arteries are normal in caliber. Atherosclerotic plaque at the carotid bifurcations and bulbs causes 40% stenosis of the proximal internal carotid arteries. The external carotid arteries are patent. No dissection. VERTEBRAL ARTERIES: The left vertebral artery is normal in caliber. The right vertebral artery is hypoplastic with diminishing contrast enhancement along its length through the V2/V3 junction with no contrast seen in the V3 segment. SOFT TISSUES: The lung apices are clear. No cervical or superior mediastinal lymphadenopathy. The larynx and pharynx are unremarkable. No acute abnormality of the salivary and thyroid glands. BONES: There is no acute fracture or suspect osseous lesion. Mild C5-6 and C6-7 degenerative disc disease is noted. CTA HEAD: ANTERIOR CIRCULATION: Calcification of the cavernous internal carotid arteries without significant stenosis. Anterior cerebral arteries are normal in caliber with hypoplasia of the right A1 segment. There is severe stenosis of bilateral right middle cerebral artery anterior M2 segments. POSTERIOR CIRCULATION: No contrast in the right vertebral artery. Left vertebral and basilar arteries are normal in caliber.   There is severe focal stenosis of the P2 segment left posterior cerebral artery and moderate focal stenosis of the P2 segment right posterior cerebral artery. OTHER: No dural venous sinus thrombosis on this non-dedicated study. BRAIN: No mass effect or midline shift. No extra-axial fluid collection. The gray-white differentiation is maintained. 1. No large vessel occlusion of the intracranial arteries. 2. 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 3. 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 4. 40% stenosis of the proximal internal carotid arteries. 5. No significant left vertebral artery stenosis. Cta Head W Contrast    Result Date: 9/25/2020  EXAMINATION: CTA OF THE HEAD WITH CONTRAST; CTA OF THE NECK 9/25/2020 7:25 pm: TECHNIQUE: CTA of the head/brain was performed with the administration of intravenous contrast. Multiplanar reformatted images are provided for review. MIP images are provided for review. Dose modulation, iterative reconstruction, and/or weight based adjustment of the mA/kV was utilized to reduce the radiation dose to as low as reasonably achievable.; CTA of the neck was performed with the administration of intravenous contrast. Multiplanar reformatted images are provided for review. MIP images are provided for review. Stenosis of the internal carotid arteries measured using NASCET criteria. Dose modulation, iterative reconstruction, and/or weight based adjustment of the mA/kV was utilized to reduce the radiation dose to as low as reasonably achievable. COMPARISON: None. HISTORY: ORDERING SYSTEM PROVIDED HISTORY: cva TECHNOLOGIST PROVIDED HISTORY: Has a \"code stroke\" or \"stroke alert\" been called? ->Yes Reason for exam:->cva Reason for Exam: cva Acuity: Acute Type of Exam: Initial FINDINGS: CTA NECK: AORTIC ARCH/ARCH VESSELS: Mild atherosclerotic plaque at the aortic arch without aneurysm or dissection. Conventional 3 vessel arch anatomy.   Mild atherosclerotic plaque involving the innominate and subclavian arteries without stenosis. CAROTID ARTERIES: The common carotid arteries are normal in caliber. Atherosclerotic plaque at the carotid bifurcations and bulbs causes 40% stenosis of the proximal internal carotid arteries. The external carotid arteries are patent. No dissection. VERTEBRAL ARTERIES: The left vertebral artery is normal in caliber. The right vertebral artery is hypoplastic with diminishing contrast enhancement along its length through the V2/V3 junction with no contrast seen in the V3 segment. SOFT TISSUES: The lung apices are clear. No cervical or superior mediastinal lymphadenopathy. The larynx and pharynx are unremarkable. No acute abnormality of the salivary and thyroid glands. BONES: There is no acute fracture or suspect osseous lesion. Mild C5-6 and C6-7 degenerative disc disease is noted. CTA HEAD: ANTERIOR CIRCULATION: Calcification of the cavernous internal carotid arteries without significant stenosis. Anterior cerebral arteries are normal in caliber with hypoplasia of the right A1 segment. There is severe stenosis of bilateral right middle cerebral artery anterior M2 segments. POSTERIOR CIRCULATION: No contrast in the right vertebral artery. Left vertebral and basilar arteries are normal in caliber. There is severe focal stenosis of the P2 segment left posterior cerebral artery and moderate focal stenosis of the P2 segment right posterior cerebral artery. OTHER: No dural venous sinus thrombosis on this non-dedicated study. BRAIN: No mass effect or midline shift. No extra-axial fluid collection. The gray-white differentiation is maintained. 1. No large vessel occlusion of the intracranial arteries. 2. 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 3. 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  4. 40% stenosis of the proximal internal carotid arteries. 5. No significant left vertebral artery stenosis. Mri Brain Without Contrast    Result Date: 9/26/2020  EXAMINATION: MRI OF THE BRAIN WITHOUT CONTRAST  9/26/2020 3:08 pm TECHNIQUE: Multiplanar multisequence MRI of the brain was performed without the administration of intravenous contrast. COMPARISON: None. HISTORY: ORDERING SYSTEM PROVIDED HISTORY: facial drooping TECHNOLOGIST PROVIDED HISTORY: Reason for exam:->facial drooping Reason for Exam: ams facial droop-pt unable to follow directions cough - repeated many scans-best images FINDINGS: Motion limited evaluation despite repeat imaging attempts. INTRACRANIAL STRUCTURES/VENTRICLES: Diffuse parenchymal volume loss with moderate chronic white matter microvascular ischemic changes and chronic lacunar infarcts in the bilateral basal ganglia and cerebellum. Punctate focus of diffusion restriction and T2/FLAIR hyperintensity in the left cerebral peduncle and left anterior midbrain. No intracranial mass. No mass effect or midline shift. No evidence of an acute intracranial hemorrhage. The ventricles and sulci are normal in size and configuration. The sellar/suprasellar regions appear unremarkable. The normal signal voids within the major intracranial vessels appear maintained. ORBITS: The visualized portion of the orbits demonstrate no acute abnormality. SINUSES: The visualized paranasal sinuses and mastoid air cells are well aerated. BONES/SOFT TISSUES: The bone marrow signal intensity appears normal. The soft tissues demonstrate no acute abnormality. 1. Motion limited evaluation. 2. Tiny acute ischemic infarct in the left cerebral peduncle and anterior mid brain. 3. No hemorrhage or mass effect. 4. Diffuse parenchymal volume loss with moderate chronic white matter microvascular ischemic changes and chronic lacunar infarcts in the bilateral basal ganglia.        Discharge Exam:  GEN    Awake male, sitting upright in bed in no apparent distress. Appears given age. EYES   Pupils are equally round. No scleral erythema, discharge, or conjunctivitis. HENT  Mucous membranes are moist. Oral pharynx without exudates, no evidence of thrush. NECK  Supple, no apparent thyromegaly or masses. RESP  Clear to auscultation, no wheezes, rales or rhonchi. Symmetric chest movement while on room air. CARDIO/VASC           S1/S2 auscultated. Regular rate without appreciable murmurs, rubs, or gallops. No JVD or carotid bruits. Peripheral pulses equal bilaterally and palpable. No peripheral edema. GI        Abdomen is soft without significant tenderness, masses, or guarding. Bowel sounds are normoactive. Rectal exam deferred.        No costovertebral angle tenderness. Normal appearing external genitalia. Mcclain catheter is not present. HEME/LYMPH            No palpable cervical lymphadenopathy and no hepatosplenomegaly. No petechiae or ecchymoses. MSK    No gross joint deformities. SKIN    Normal coloration, warm, dry. NEURO           Cranial nerves appear grossly intact, normal speech, no lateralizing weakness. PSYCH            Awake, alert, oriented x 1-2. Affect appropriate. Disposition: home    Patient Instructions:     Discharge Medications:   Rosalina Jame   Home Medication Instructions YGN:[id number]    Printed on:09/30/20 1331   Medication Information                      aspirin 81 MG EC tablet  Take 1 tablet by mouth daily             atorvastatin (LIPITOR) 20 MG tablet  Take 1 tablet by mouth nightly             blood glucose monitor kit and supplies  Test 1 times a day & as needed for symptoms of irregular blood glucose. blood glucose monitor strips  Test 1 times a day & as needed for symptoms of irregular blood glucose.              clopidogrel (PLAVIX) 75 MG tablet  Take 1 tablet by mouth daily             Compression Stockings MISC  by Does not apply route Use daily for treatment of leg edema             haloperidol (HALDOL) 2 MG/ML solution  Give 0.25 ml (= 0.5 mg) oral or sublingually every 6 hours if needed for agitation or nausea. Use on advice of Hospice. Lancets MISC  1 each by Does not apply route daily             LORazepam (ATIVAN) 0.5 MG tablet  Give ONE (1) tablet orally (or crushed and dissolved if not swallowing) every FOUR (4) hours if needed for anxiety or restlessness. Morphine Sulfate (MORPHINE 20MG/ML) SOLN concentrated solution  Give 2.5 mg (0.125 ml) orally or under the tongue every 3 (three) hours if needed for moderate pain or shortness of breath. Give 5 mg (0.25 ml) orally or under the tongue every 3 (three) hours if needed for severe pain or shortness of breath. Use only on advice of hospice             oxybutynin (DITROPAN-XL) 10 MG extended release tablet  TAKE 1 TABLET BY MOUTH EVERY DAY             Petrolatum-Zinc Oxide (PHYTOPLEX Z-GUARD EX)  Apply topically             tamsulosin (FLOMAX) 0.4 MG capsule  Take 1 capsule by mouth daily                 Activity: activity as tolerated    Diet: regular diet    Follow-up:  Hospice    Time Spent Doing discharge 33 min    Patient was not discharged yesterday as there is issues with Mcclain catheter, but currently has urine in the bag. Hence, can be discharged today.     Electronically signed by Karen Lomeli MD on 9/30/2020 at 1:31 PM

## 2020-12-21 NOTE — PROGRESS NOTES
2020    TELEHEALTH EVALUATION -- Audio/Visual (During -83 public health emergency)    HPI:    Michelle Kongregate (:  1929) has requested an audio/video evaluation for the following concern(s):    History provided by wife and daughter as pt is a poor historian because of his dementia    Fall  He has fallen again. No significant injury reported. It is getting harder and harder for his wife to assist him in getting up. He fell in the shower, despite having a chair. He uses a walker or cane to get around the house and outside the home. Because of generalized weakness, it is getting almost impossible for his wife to bath him properly. Shaving is not done. Dressing and additional grooming is also difficult. Dementia  Chronic and progressing. His confusion has increased. He is not aggressive. Review of Systems   Respiratory: Negative for shortness of breath. Cardiovascular: Positive for leg swelling (feet). Negative for chest pain.        Prior to Visit Medications    Not on File       Social History     Tobacco Use    Smoking status: Former Smoker     Last attempt to quit: 10/17/1987     Years since quittin.5    Smokeless tobacco: Never Used    Tobacco comment: quit over 25 years ago    Substance Use Topics    Alcohol use: Yes     Comment: mixed drink before dinner    Drug use: No        No past medical history on file., No past surgical history on file.,   Family History   Problem Relation Age of Onset    Cancer Sister     Diabetes Brother        PHYSICAL EXAMINATION:  [ INSTRUCTIONS:  \"[x]\" Indicates a positive item  \"[]\" Indicates a negative item  -- DELETE ALL ITEMS NOT EXAMINED]  Vital Signs: (As obtained by patient/caregiver or practitioner observation)        Constitutional: [x] Appears well-developed and well-nourished [x] No apparent distress      [] Abnormal-   Mental status  [x] Alert and awake  [] Oriented to person/place/time [x]Able to follow commands  - hard of hearing is
no